# Patient Record
Sex: MALE | Employment: OTHER | URBAN - METROPOLITAN AREA
[De-identification: names, ages, dates, MRNs, and addresses within clinical notes are randomized per-mention and may not be internally consistent; named-entity substitution may affect disease eponyms.]

---

## 2018-08-01 ENCOUNTER — HOSPITAL ENCOUNTER (OUTPATIENT)
Dept: PREADMISSION TESTING | Age: 73
Discharge: HOME | End: 2018-08-01
Payer: MEDICARE

## 2018-08-01 VITALS
SYSTOLIC BLOOD PRESSURE: 131 MMHG | TEMPERATURE: 95.6 F | WEIGHT: 175.38 LBS | BODY MASS INDEX: 26.58 KG/M2 | HEIGHT: 68 IN | DIASTOLIC BLOOD PRESSURE: 76 MMHG

## 2018-08-01 LAB
APPEARANCE UR: CLEAR
ATRIAL RATE: 65 BPM
BACTERIA URNS QL MICRO: NEGATIVE /HPF
BILIRUB UR QL: NEGATIVE
CALCULATED P AXIS, ECG09: 18 DEGREES
CALCULATED R AXIS, ECG10: 11 DEGREES
CALCULATED T AXIS, ECG11: 16 DEGREES
COLOR UR: ABNORMAL
DIAGNOSIS, 93000: NORMAL
EPITH CASTS URNS QL MICRO: ABNORMAL /LPF
GLUCOSE UR STRIP.AUTO-MCNC: NEGATIVE MG/DL
HGB UR QL STRIP: NEGATIVE
HYALINE CASTS URNS QL MICRO: ABNORMAL /LPF (ref 0–5)
KETONES UR QL STRIP.AUTO: NEGATIVE MG/DL
LEUKOCYTE ESTERASE UR QL STRIP.AUTO: ABNORMAL
NITRITE UR QL STRIP.AUTO: NEGATIVE
P-R INTERVAL, ECG05: 174 MS
PH UR STRIP: 6 [PH] (ref 5–8)
PROT UR STRIP-MCNC: NEGATIVE MG/DL
Q-T INTERVAL, ECG07: 398 MS
QRS DURATION, ECG06: 76 MS
QTC CALCULATION (BEZET), ECG08: 413 MS
RBC #/AREA URNS HPF: ABNORMAL /HPF (ref 0–5)
SP GR UR REFRACTOMETRY: 1.01 (ref 1–1.03)
UROBILINOGEN UR QL STRIP.AUTO: 0.2 EU/DL (ref 0.2–1)
VENTRICULAR RATE, ECG03: 65 BPM
WBC URNS QL MICRO: ABNORMAL /HPF (ref 0–4)

## 2018-08-01 PROCEDURE — 87086 URINE CULTURE/COLONY COUNT: CPT | Performed by: UROLOGY

## 2018-08-01 PROCEDURE — 81001 URINALYSIS AUTO W/SCOPE: CPT | Performed by: UROLOGY

## 2018-08-01 PROCEDURE — 93005 ELECTROCARDIOGRAM TRACING: CPT

## 2018-08-01 RX ORDER — CALCIUM CARBONATE 500(1250)
2 TABLET ORAL DAILY
COMMUNITY

## 2018-08-01 RX ORDER — ASCORBIC ACID 500 MG
500 TABLET ORAL DAILY
COMMUNITY

## 2018-08-01 RX ORDER — ASPIRIN 81 MG/1
81 TABLET ORAL DAILY
COMMUNITY

## 2018-08-01 RX ORDER — ATORVASTATIN CALCIUM 20 MG/1
20 TABLET, FILM COATED ORAL
COMMUNITY

## 2018-08-01 RX ORDER — LISINOPRIL 10 MG/1
10 TABLET ORAL
COMMUNITY

## 2018-08-01 RX ORDER — ACETAMINOPHEN 500 MG
4000 TABLET ORAL DAILY
COMMUNITY

## 2018-08-01 RX ORDER — ACETAMINOPHEN 500 MG
1000 TABLET ORAL
COMMUNITY

## 2018-08-01 NOTE — PERIOP NOTES
Pre-Operative Instructions DO NOT EAT OR DRINK ANYTHING AFTER MIDNIGHT THE NIGHT BEFORE SURGERY. Patient given surgical site infection information FAQs handout and hand hygiene tips sheet. Pre-operative instructions reviewed and patient verbalizes understanding of instructions. Patient has been given the opportunity to ask additional questions.

## 2018-08-02 LAB
BACTERIA SPEC CULT: NORMAL
CC UR VC: NORMAL
SERVICE CMNT-IMP: NORMAL

## 2018-08-08 ENCOUNTER — ANESTHESIA (OUTPATIENT)
Dept: SURGERY | Age: 73
End: 2018-08-08
Payer: MEDICARE

## 2018-08-08 ENCOUNTER — HOSPITAL ENCOUNTER (OUTPATIENT)
Age: 73
Setting detail: OUTPATIENT SURGERY
Discharge: HOME | End: 2018-08-08
Attending: UROLOGY | Admitting: UROLOGY
Payer: MEDICARE

## 2018-08-08 ENCOUNTER — ANESTHESIA EVENT (OUTPATIENT)
Dept: SURGERY | Age: 73
End: 2018-08-08
Payer: MEDICARE

## 2018-08-08 VITALS
SYSTOLIC BLOOD PRESSURE: 146 MMHG | RESPIRATION RATE: 17 BRPM | HEIGHT: 68 IN | DIASTOLIC BLOOD PRESSURE: 82 MMHG | BODY MASS INDEX: 26.58 KG/M2 | WEIGHT: 175.38 LBS | HEART RATE: 87 BPM | OXYGEN SATURATION: 98 % | TEMPERATURE: 97.8 F

## 2018-08-08 PROCEDURE — 74011250636 HC RX REV CODE- 250/636

## 2018-08-08 PROCEDURE — 74011250636 HC RX REV CODE- 250/636: Performed by: UROLOGY

## 2018-08-08 PROCEDURE — 77030026438 HC STYL ET INTUB CARD -A: Performed by: ANESTHESIOLOGY

## 2018-08-08 PROCEDURE — 77030010545: Performed by: UROLOGY

## 2018-08-08 PROCEDURE — 76210000021 HC REC RM PH II 0.5 TO 1 HR: Performed by: UROLOGY

## 2018-08-08 PROCEDURE — 77030005546 HC CATH URETH FOL 3W BARD -A: Performed by: UROLOGY

## 2018-08-08 PROCEDURE — 77030011274 HC ELECTRD CUT LP RWOL -F: Performed by: UROLOGY

## 2018-08-08 PROCEDURE — 76210000016 HC OR PH I REC 1 TO 1.5 HR: Performed by: UROLOGY

## 2018-08-08 PROCEDURE — 77030011640 HC PAD GRND REM COVD -A: Performed by: UROLOGY

## 2018-08-08 PROCEDURE — 88305 TISSUE EXAM BY PATHOLOGIST: CPT | Performed by: UROLOGY

## 2018-08-08 PROCEDURE — 74011250636 HC RX REV CODE- 250/636: Performed by: ANESTHESIOLOGY

## 2018-08-08 PROCEDURE — 76010000149 HC OR TIME 1 TO 1.5 HR: Performed by: UROLOGY

## 2018-08-08 PROCEDURE — 77030021707 HC SET IRR FLD WRMR 3M -B: Performed by: UROLOGY

## 2018-08-08 PROCEDURE — 77030008684 HC TU ET CUF COVD -B: Performed by: ANESTHESIOLOGY

## 2018-08-08 PROCEDURE — 77030018846 HC SOL IRR STRL H20 ICUM -A: Performed by: UROLOGY

## 2018-08-08 PROCEDURE — 77030010509 HC AIRWY LMA MSK TELE -A: Performed by: ANESTHESIOLOGY

## 2018-08-08 PROCEDURE — 74011000250 HC RX REV CODE- 250

## 2018-08-08 PROCEDURE — 77030018830 HC SOL IRR GLYC ICUM-A: Performed by: UROLOGY

## 2018-08-08 PROCEDURE — 76060000033 HC ANESTHESIA 1 TO 1.5 HR: Performed by: UROLOGY

## 2018-08-08 PROCEDURE — 77030032490 HC SLV COMPR SCD KNE COVD -B: Performed by: UROLOGY

## 2018-08-08 RX ORDER — MORPHINE SULFATE 10 MG/ML
2 INJECTION, SOLUTION INTRAMUSCULAR; INTRAVENOUS
Status: DISCONTINUED | OUTPATIENT
Start: 2018-08-08 | End: 2018-08-08 | Stop reason: HOSPADM

## 2018-08-08 RX ORDER — SODIUM CHLORIDE, SODIUM LACTATE, POTASSIUM CHLORIDE, CALCIUM CHLORIDE 600; 310; 30; 20 MG/100ML; MG/100ML; MG/100ML; MG/100ML
125 INJECTION, SOLUTION INTRAVENOUS CONTINUOUS
Status: DISCONTINUED | OUTPATIENT
Start: 2018-08-08 | End: 2018-08-08 | Stop reason: HOSPADM

## 2018-08-08 RX ORDER — ONDANSETRON 2 MG/ML
INJECTION INTRAMUSCULAR; INTRAVENOUS AS NEEDED
Status: DISCONTINUED | OUTPATIENT
Start: 2018-08-08 | End: 2018-08-08 | Stop reason: HOSPADM

## 2018-08-08 RX ORDER — SODIUM CHLORIDE 0.9 % (FLUSH) 0.9 %
5-10 SYRINGE (ML) INJECTION EVERY 8 HOURS
Status: DISCONTINUED | OUTPATIENT
Start: 2018-08-08 | End: 2018-08-08 | Stop reason: HOSPADM

## 2018-08-08 RX ORDER — ONDANSETRON 2 MG/ML
4 INJECTION INTRAMUSCULAR; INTRAVENOUS AS NEEDED
Status: DISCONTINUED | OUTPATIENT
Start: 2018-08-08 | End: 2018-08-08 | Stop reason: HOSPADM

## 2018-08-08 RX ORDER — SODIUM CHLORIDE 0.9 % (FLUSH) 0.9 %
5-10 SYRINGE (ML) INJECTION AS NEEDED
Status: DISCONTINUED | OUTPATIENT
Start: 2018-08-08 | End: 2018-08-08 | Stop reason: HOSPADM

## 2018-08-08 RX ORDER — PROPOFOL 10 MG/ML
INJECTION, EMULSION INTRAVENOUS AS NEEDED
Status: DISCONTINUED | OUTPATIENT
Start: 2018-08-08 | End: 2018-08-08 | Stop reason: HOSPADM

## 2018-08-08 RX ORDER — SODIUM CHLORIDE 9 MG/ML
50 INJECTION, SOLUTION INTRAVENOUS CONTINUOUS
Status: DISCONTINUED | OUTPATIENT
Start: 2018-08-08 | End: 2018-08-08 | Stop reason: HOSPADM

## 2018-08-08 RX ORDER — DEXAMETHASONE SODIUM PHOSPHATE 4 MG/ML
INJECTION, SOLUTION INTRA-ARTICULAR; INTRALESIONAL; INTRAMUSCULAR; INTRAVENOUS; SOFT TISSUE AS NEEDED
Status: DISCONTINUED | OUTPATIENT
Start: 2018-08-08 | End: 2018-08-08 | Stop reason: HOSPADM

## 2018-08-08 RX ORDER — LIDOCAINE HYDROCHLORIDE 10 MG/ML
0.1 INJECTION, SOLUTION EPIDURAL; INFILTRATION; INTRACAUDAL; PERINEURAL AS NEEDED
Status: DISCONTINUED | OUTPATIENT
Start: 2018-08-08 | End: 2018-08-08 | Stop reason: HOSPADM

## 2018-08-08 RX ORDER — OXYCODONE AND ACETAMINOPHEN 5; 325 MG/1; MG/1
1 TABLET ORAL AS NEEDED
Status: DISCONTINUED | OUTPATIENT
Start: 2018-08-08 | End: 2018-08-08 | Stop reason: HOSPADM

## 2018-08-08 RX ORDER — MIDAZOLAM HYDROCHLORIDE 1 MG/ML
1 INJECTION, SOLUTION INTRAMUSCULAR; INTRAVENOUS AS NEEDED
Status: DISCONTINUED | OUTPATIENT
Start: 2018-08-08 | End: 2018-08-08 | Stop reason: HOSPADM

## 2018-08-08 RX ORDER — SODIUM CHLORIDE 9 MG/ML
1000 INJECTION, SOLUTION INTRAVENOUS CONTINUOUS
Status: DISCONTINUED | OUTPATIENT
Start: 2018-08-08 | End: 2018-08-08 | Stop reason: HOSPADM

## 2018-08-08 RX ORDER — PHENAZOPYRIDINE HYDROCHLORIDE 100 MG/1
100 TABLET, FILM COATED ORAL
Qty: 21 TAB | Refills: 1 | Status: SHIPPED | OUTPATIENT
Start: 2018-08-08 | End: 2018-08-15

## 2018-08-08 RX ORDER — SUCCINYLCHOLINE CHLORIDE 20 MG/ML
INJECTION INTRAMUSCULAR; INTRAVENOUS AS NEEDED
Status: DISCONTINUED | OUTPATIENT
Start: 2018-08-08 | End: 2018-08-08 | Stop reason: HOSPADM

## 2018-08-08 RX ORDER — FENTANYL CITRATE 50 UG/ML
25 INJECTION, SOLUTION INTRAMUSCULAR; INTRAVENOUS
Status: DISCONTINUED | OUTPATIENT
Start: 2018-08-08 | End: 2018-08-08 | Stop reason: HOSPADM

## 2018-08-08 RX ORDER — DIPHENHYDRAMINE HYDROCHLORIDE 50 MG/ML
12.5 INJECTION, SOLUTION INTRAMUSCULAR; INTRAVENOUS AS NEEDED
Status: DISCONTINUED | OUTPATIENT
Start: 2018-08-08 | End: 2018-08-08 | Stop reason: HOSPADM

## 2018-08-08 RX ORDER — FENTANYL CITRATE 50 UG/ML
50 INJECTION, SOLUTION INTRAMUSCULAR; INTRAVENOUS AS NEEDED
Status: DISCONTINUED | OUTPATIENT
Start: 2018-08-08 | End: 2018-08-08 | Stop reason: HOSPADM

## 2018-08-08 RX ORDER — CEFAZOLIN SODIUM/WATER 2 G/20 ML
2 SYRINGE (ML) INTRAVENOUS
Status: COMPLETED | OUTPATIENT
Start: 2018-08-08 | End: 2018-08-08

## 2018-08-08 RX ORDER — FENTANYL CITRATE 50 UG/ML
INJECTION, SOLUTION INTRAMUSCULAR; INTRAVENOUS AS NEEDED
Status: DISCONTINUED | OUTPATIENT
Start: 2018-08-08 | End: 2018-08-08 | Stop reason: HOSPADM

## 2018-08-08 RX ORDER — MIDAZOLAM HYDROCHLORIDE 1 MG/ML
0.5 INJECTION, SOLUTION INTRAMUSCULAR; INTRAVENOUS
Status: DISCONTINUED | OUTPATIENT
Start: 2018-08-08 | End: 2018-08-08 | Stop reason: HOSPADM

## 2018-08-08 RX ORDER — LIDOCAINE HYDROCHLORIDE 20 MG/ML
INJECTION, SOLUTION EPIDURAL; INFILTRATION; INTRACAUDAL; PERINEURAL AS NEEDED
Status: DISCONTINUED | OUTPATIENT
Start: 2018-08-08 | End: 2018-08-08 | Stop reason: HOSPADM

## 2018-08-08 RX ORDER — DEXMEDETOMIDINE HYDROCHLORIDE 4 UG/ML
INJECTION, SOLUTION INTRAVENOUS AS NEEDED
Status: DISCONTINUED | OUTPATIENT
Start: 2018-08-08 | End: 2018-08-08 | Stop reason: HOSPADM

## 2018-08-08 RX ADMIN — FENTANYL CITRATE 50 MCG: 50 INJECTION, SOLUTION INTRAMUSCULAR; INTRAVENOUS at 07:35

## 2018-08-08 RX ADMIN — ONDANSETRON 4 MG: 2 INJECTION INTRAMUSCULAR; INTRAVENOUS at 07:47

## 2018-08-08 RX ADMIN — DEXMEDETOMIDINE HYDROCHLORIDE 20 MCG: 4 INJECTION, SOLUTION INTRAVENOUS at 08:08

## 2018-08-08 RX ADMIN — PROPOFOL 50 MG: 10 INJECTION, EMULSION INTRAVENOUS at 08:08

## 2018-08-08 RX ADMIN — Medication 2 G: at 07:40

## 2018-08-08 RX ADMIN — LIDOCAINE HYDROCHLORIDE 100 MG: 20 INJECTION, SOLUTION EPIDURAL; INFILTRATION; INTRACAUDAL; PERINEURAL at 07:35

## 2018-08-08 RX ADMIN — PROPOFOL 150 MG: 10 INJECTION, EMULSION INTRAVENOUS at 07:35

## 2018-08-08 RX ADMIN — DEXAMETHASONE SODIUM PHOSPHATE 4 MG: 4 INJECTION, SOLUTION INTRA-ARTICULAR; INTRALESIONAL; INTRAMUSCULAR; INTRAVENOUS; SOFT TISSUE at 07:47

## 2018-08-08 RX ADMIN — SUCCINYLCHOLINE CHLORIDE 160 MG: 20 INJECTION INTRAMUSCULAR; INTRAVENOUS at 07:38

## 2018-08-08 RX ADMIN — FENTANYL CITRATE 50 MCG: 50 INJECTION, SOLUTION INTRAMUSCULAR; INTRAVENOUS at 07:55

## 2018-08-08 RX ADMIN — SODIUM CHLORIDE, POTASSIUM CHLORIDE, SODIUM LACTATE AND CALCIUM CHLORIDE: 600; 310; 30; 20 INJECTION, SOLUTION INTRAVENOUS at 06:24

## 2018-08-08 NOTE — BRIEF OP NOTE
BRIEF OPERATIVE NOTE    Date of Procedure: 8/8/2018   Preoperative Diagnosis: BLADDER CANCER   Postoperative Diagnosis: BLADDER CANCER     Procedure(s):  Transurethral Resection of Bladder Tumor and Prostatic Urethral Biospy  Surgeon(s) and Role:     * Esequiel Webber MD - Primary         Surgical Assistant: 0    Surgical Staff:  Circ-1: Elsy Vargas RN  Scrub Tech-1: Jessica Darling  Float Staff: Beka Roland RN  Event Time In   Incision Start 7583   Incision Close 0818     Anesthesia: General   Estimated Blood Loss: 50  Specimens:   ID Type Source Tests Collected by Time Destination   1 : [facility] Lesion Preservative Bladder  Esequiel Webber MD 8/8/2018 7162 Pathology   2 : Right Posterior Wall of [place] Preservative Bladder  Esequiel Webber MD 8/8/2018 0809 Pathology   3 : Right Lateral Wall of Bladder Preservative Bladder  Esequiel Webber MD 8/8/2018 3352 Pathology   4 : Left Lateral Wall of Bladder Preservative Bladder  Esequiel Webber MD 8/8/2018 0813 Pathology   5 : Prostatic Urethra Biopsy Preservative Loc Webber MD 8/8/2018 7677 Pathology      Findings: red patches. Bleed easily.  bx done   Complications: 0  Implants: * No implants in log *

## 2018-08-08 NOTE — PERIOP NOTES
Called to the Surgical Waiting and spoke with the patient's wife to let them know that we had started the procedure at 748 am.  Also that we would update them as needed.

## 2018-08-08 NOTE — DISCHARGE INSTRUCTIONS
Patient Discharge Instructions    Guerrero Perdomo / [id number] : 1945    Admitted 2018 Discharged: 2018     Take Home Medications       · It is important that you take the medication exactly as they are prescribed. · Keep your medication in the bottles provided by the pharmacist and keep a list of the medication names, dosages, and times to be taken in your wallet. · Do not take other medications without consulting your doctor. What to do at Home      Recommended activity: No Lifting for 6 weeks. Follow-up with [address] [place] [address]  Urology. Call for an appointment (if not already scheduled)            [phone]. Information obtained by :  I understand that if any problems occur once I am at home I am to contact my physician. I understand and acknowledge receipt of the instructions indicated above. Physician's or R.N.'s Signature                                                                  Date/Time                                                                                                                                              Patient or Representative Signature                                                          Date/Time        DISCHARGE SUMMARY from Nurse    PATIENT INSTRUCTIONS:    After general anesthesia or intravenous sedation, for 24 hours or while taking prescription Narcotics:  · Limit your activities  · Do not drive and operate hazardous machinery  · Do not make important personal or business decisions  · Do  not drink alcoholic beverages  · If you have not urinated within 8 hours after discharge, please contact your surgeon on call.     Report the following to your surgeon:  · Excessive pain, swelling, redness or odor of or around the surgical area  · Temperature over 100.5  · Nausea and vomiting lasting longer than 4 hours or if unable to take medications  · Any signs of decreased circulation or nerve impairment to extremity: change in color, persistent  numbness, tingling, coldness or increase pain  · Any questions    The discharge information has been reviewed with the patient and spouse. The patient and spouse verbalized understanding. Discharge medications reviewed with the patient and spouse and appropriate educational materials and side effects teaching were provided.   ___________________________________________________________________________________________________________________________________

## 2018-08-08 NOTE — OP NOTES
[address] MEGAN Mcmillan  MR#: [id number]  : 1945  ACCOUNT #: [de-identified]   DATE OF SERVICE: 2018    PREOPERATIVE DIAGNOSIS:  Recurrent carcinoma in situ. POSTOPERATIVE DIAGNOSIS:  Recurrent carcinoma in situ. PROCEDURE PERFORMED:  Transurethral resection, medium bladder lesion. COMPLICATIONS:  None. ESTIMATED BLOOD LOSS:  50 mL. SPECIMENS REMOVED:  Right posterior wall, dome, left lateral wall, right lateral wall, prostatic urethra. ANESTHESIA:  General.    IMPLANTS:  None. ASSISTANT:  None. SURGEON:  Sonny Marino MD    PROCEDURE:  After anesthesia, the patient was prepped and draped in the lithotomy position. The 21 cystoscope was passed down the urethra. There was some erythema within the prostatic urethra, centered more on the left side. Upon entering the bladder and distending the bladder with irrigation, spontaneous bleeding began to occur from a velvet reddened patch involving the right posterior wall and another smaller velvety reddened area in the dome to the left of the midline. The area involving the right posterior wall was 4 cm and the one in the dome was 2 cm. Using the transurethral resection forceps, multiple pieces of tissue were taken from both of these areas and then the lesion was aggressively fulgurated for hemostasis and treatment effect. Samples were taken from the right lateral wall, left lateral wall, and the erythematous area within the prostatic urethra was also sampled and fulgurated. Hemostasis was obtained with the Bugbee electrode and bleeding seemed to be well controlled. A 24-Wolof 3-way Reyes was inserted and put to straight drain and the return was clear. The patient was reacted from anesthesia and transferred to the recovery in stable condition.       MD LASHELL Stephens / ONEYDA  D: 2018 08:19     T: 2018 14:44  JOB #: [id number]  CC: Bertin Gross MD

## 2018-08-08 NOTE — ANESTHESIA PREPROCEDURE EVALUATION
Anesthetic History   No history of anesthetic complications            Review of Systems / Medical History  Patient summary reviewed, nursing notes reviewed and pertinent labs reviewed    Pulmonary  Within defined limits                 Neuro/Psych   Within defined limits           Cardiovascular  Within defined limits  Hypertension                   GI/Hepatic/Renal  Within defined limits   GERD           Endo/Other  Within defined limits           Other Findings              Physical Exam    Airway  Mallampati: I  TM Distance: > 6 cm  Neck ROM: normal range of motion   Mouth opening: Normal     Cardiovascular  Regular rate and rhythm,  S1 and S2 normal,  no murmur, click, rub, or gallop             Dental  No notable dental hx       Pulmonary  Breath sounds clear to auscultation               Abdominal  GI exam deferred       Other Findings            Anesthetic Plan    ASA: 2  Anesthesia type: general          Induction: Intravenous  Anesthetic plan and risks discussed with: Patient

## 2018-08-08 NOTE — PERIOP NOTES
Patient: Jaxon Bruce MRN: [id number]  SSN: [id number]   YOB: 1945  Age: 74 y.o. Sex: male     Patient is status post Procedure(s):  Transurethral Resection of Bladder Tumor and Prostatic Urethral Biospy.     Surgeon(s) and Role:     * Flora Aldana MD - Primary    Local/Dose/Irrigation:  See [facility]                  Peripheral IV 08/08/18 Right Antecubital (Active)   Dressing Status New 8/8/2018  7:16 AM   Dressing Type Transparent 8/8/2018  7:16 AM   Hub Color/Line Status Infusing 8/8/2018  7:16 AM                           Dressing/Packing:     Splint/Cast:  ]    Other:

## 2018-08-08 NOTE — ANESTHESIA POSTPROCEDURE EVALUATION
Post-Anesthesia Evaluation and Assessment    Patient: Kaleigh Melendrez MRN: [id number]  SSN: [id number]    YOB: 1945  Age: 74 y.o. Sex: male       Cardiovascular Function/Vital Signs  Visit Vitals    /82    Pulse 87    Temp 36.6 °C (97.8 °F)    Resp 17    Ht 5' 8\" (1.727 m)    Wt 79.5 kg (175 lb 6 oz)    SpO2 98%    BMI 26.67 kg/m2       Patient is status post general anesthesia for Procedure(s):  Transurethral Resection of Bladder Tumor and Prostatic Urethral Biospy. Nausea/Vomiting: None    Postoperative hydration reviewed and adequate. Pain:  Pain Scale 1: Numeric (0 - 10) (08/08/18 0915)  Pain Intensity 1: 0 (feels urge to urinate) (08/08/18 0915)   Managed    Neurological Status:   Neuro (WDL): Within Defined Limits (08/08/18 0845)   At baseline    Mental Status and Level of Consciousness: Arousable    Pulmonary Status:   O2 Device: Room air (08/08/18 0915)   Adequate oxygenation and airway patent    Complications related to anesthesia: None    Post-anesthesia assessment completed.  No concerns    Signed By: Susanne Cervantes MD     August 8, 2018

## 2018-08-08 NOTE — PERIOP NOTES
0915 voiding trial done, pt tp;erated 180cc sterile wATER INSERTION. Able to void 180cc into urinal.

## 2018-08-08 NOTE — IP AVS SNAPSHOT
[facility] Tamara [address] 13 
[phone] Patient: Janine Kay MRN: [id number] PEE:0/60/2411 About your hospitalization You were admitted on:  August 8, 2018 You last received care in the:  [facility] PACU You were discharged on:  August 8, 2018 Why you were hospitalized Your primary diagnosis was:  Not on File Follow-up Information Follow up With Details Comments Contact Info Jeanine Looney MD   [facility] [address] 
[phone] Discharge Orders None A check nel indicates which time of day the medication should be taken. My Medications START taking these medications Instructions Each Dose to Equal  
 Morning Noon Evening Bedtime  
 phenazopyridine 100 mg tablet Commonly known as:  PYRIDIUM Your last dose was: Your next dose is: Take 1 Tab by mouth three (3) times daily as needed for Pain for up to 7 days. 100 mg CONTINUE taking these medications Instructions Each Dose to Equal  
 Morning Noon Evening Bedtime  
 aspirin delayed-release 81 mg tablet Your last dose was: Your next dose is: Take 81 mg by mouth daily. 81 mg  
    
   
   
   
  
 calcium carbonate 500 mg calcium (1,250 mg) tablet Commonly known as:  OS-BLANE Your last dose was: Your next dose is: Take 2 Tabs by mouth daily. 2 Tab Cholecalciferol (Vitamin D3) 2,000 unit Cap capsule Commonly known as:  VITAMIN D3 Your last dose was: Your next dose is: Take 2,000 Units by mouth daily. 2000 Units LIPITOR 20 mg tablet Generic drug:  atorvastatin Your last dose was: Your next dose is: Take 20 mg by mouth daily. 20 mg  
    
   
   
   
  
 lisinopril 10 mg tablet Commonly known as:  Carmelina Andrews Your last dose was: Your next dose is: Take 10 mg by mouth nightly. 10 mg  
    
   
   
   
  
 SUPER B COMPLEX PO Your last dose was: Your next dose is: Take 1 Tab by mouth daily. 1 Tab TYLENOL EXTRA STRENGTH 500 mg tablet Generic drug:  acetaminophen Your last dose was: Your next dose is: Take 1,000 mg by mouth every six (6) hours as needed for Pain. 1000 mg VITALINE COQ10 PO Your last dose was: Your next dose is: Take 200 mg by mouth daily. 200 mg  
    
   
   
   
  
 VITAMIN C 500 mg tablet Generic drug:  ascorbic acid (vitamin C) Your last dose was: Your next dose is: Take 500 mg by mouth daily. 500 mg Where to Get Your Medications Information on where to get these meds will be given to you by the nurse or doctor. ! Ask your nurse or doctor about these medications  
  phenazopyridine 100 mg tablet Discharge Instructions Patient Discharge Instructions Samantha Joseph / [id number] : 1945 Admitted 2018 Discharged: 2018 Take Home Medications · It is important that you take the medication exactly as they are prescribed. · Keep your medication in the bottles provided by the pharmacist and keep a list of the medication names, dosages, and times to be taken in your wallet. · Do not take other medications without consulting your doctor. What to do at [facility] Recommended activity: No Lifting for 6 weeks. Follow-up with [place]  [facility]. Call for an appointment (if not already scheduled)            [phone]. Information obtained by : 
I understand that if any problems occur once I am at home I am to contact my physician. I understand and acknowledge receipt of the instructions indicated above. Physician's or R.N.'s Signature                                                                  Date/Time Patient or Representative Signature                                                          Date/Time DISCHARGE SUMMARY from Nurse PATIENT INSTRUCTIONS: 
 
After general anesthesia or intravenous sedation, for 24 hours or while taking prescription Narcotics: · Limit your activities · Do not drive and operate hazardous machinery · Do not make important personal or business decisions · Do  not drink alcoholic beverages · If you have not urinated within 8 hours after discharge, please contact your surgeon on call. Report the following to your surgeon: 
· Excessive pain, swelling, redness or odor of or around the surgical area · Temperature over 100.5 · Nausea and vomiting lasting longer than 4 hours or if unable to take medications · Any signs of decreased circulation or nerve impairment to extremity: change in color, persistent  numbness, tingling, coldness or increase pain · Any questions The discharge information has been reviewed with the patient and spouse. The patient and spouse verbalized understanding. Discharge medications reviewed with the patient and spouse and appropriate educational materials and side effects teaching were provided. ___________________________________________________________________________________________________________________________________ Introducing [facility]!    
 [facility] introduces [employer] patient portal. Now you can access parts of your medical record, email your doctor's office, and request medication refills online. 1. In your internet browser, go to [email or web address] 2. Click on the First Time User? Click Here link in the Sign In box. You will see the New Member Sign Up page. 3. Enter your [employer] Access Code exactly as it appears below. You will not need to use this code after youve completed the sign-up process. If you do not sign up before the expiration date, you must request a new code. · [employer] Access Code: [email or web address] Expires: 10/30/2018 10:15 AM 
 
4. Enter the last four digits of your Social Security Number (xxxx) and Date of Birth (mm/dd/yyyy) as indicated and click Submit. You will be taken to the next sign-up page. 5. Create a GrupHediye ID. This will be your Gr[employer] login ID and cannot be changed, so think of one that is secure and easy to remember. 6. Create a [employer] password. You can change your password at any time. 7. Enter your Password Reset Question and Answer. This can be used at a later time if you forget your password. 8. Enter your e-mail address. You will receive e-mail notification when new information is available in [address] 9. Click Sign Up. You can now view and download portions of your medical record. 10. Click the Download Summary menu link to download a portable copy of your medical information. If you have questions, please visit the Frequently Asked Questions section of the [employer] website. Remember, [employer]ye is NOT to be used for urgent needs. For medical emergencies, dial 911. Now available from your iPhone and Android! Introducing Zan Phillips As a [facility] patient, I wanted to make you aware of our electronic visit tool called Zan Phillips. [facility] [address] 24/7 allows you to connect within minutes with a medical provider 24 hours a day, seven days a week via a mobile device or tablet or logging into a secure website from your computer. You can access [employer] from anywhere in the [place]. A virtual visit might be right for you when you have a simple condition and feel like you just dont want to get out of bed, or cant get away from work for an appointment, when your regular [facility] provider is not available (evenings, weekends or holidays), or when youre out of town and need minor care. Electronic visits cost only $49 and if the [employer] 24/Canary Calendar provider determines a prescription is needed to treat your condition, one can be electronically transmitted to a nearby pharmacy*. Please take a moment to enroll today if you have not already done so. The enrollment process is free and takes just a few minutes. To enroll, please download the [employer] keyla to your tablet or phone, or visit [email or web address] to enroll on your computer. And, as an [address] [place] [address] patient with a Aqua-tools account, the results of your visits will be scanned into your electronic medical record and your primary care provider will be able to view the scanned results. We urge you to continue to see your regular [facility] provider for your ongoing medical care. And while your primary care provider may not be the one available when you seek a Zan Formanfin virtual visit, the peace of mind you get from getting a real diagnosis real time can be priceless. For more information on [employer], view our Frequently Asked Questions (FAQs) at [email or web address] [email or web address]. Sincerely, 
 
Amy Benjamin MD 
Chief Medical Officer [employer] *:  certain medications cannot be prescribed via [employer] Providers Seen During Your Hospitalization Provider Specialty Primary office phone Julienne Reynolds MD Urology [phone] Your Primary Care Physician (PCP) Primary Care Physician Office Phone Office Fax Davi Alonzo [phone] You are allergic to the following No active allergies Recent Documentation Height Weight BMI Smoking Status 1.727 m 79.5 kg 26.67 kg/m2 Former Smoker Emergency Contacts Name Discharge Info [facility]ation [facility] DISCHARGE CAREGIVER [3] Spouse [3] [phone] Patient Belongings The following personal items are in your possession at time of discharge: 
  Dental Appliances: Partials, Uppers  Visual Aid: Glasses, With patient      Home Medications: None   Jewelry: None  Clothing: Other (comment) (clothes bag and glasses to pacu)    Other Valuables: Eyeglasses, With patient, Brace (leg brace) Discharge Instructions Attachments/References MEFS - PHENAZOPYRIDINE (PYRIDIUM, PYRIDIATE, AZO STANDARD) - (BY MOUTH) (ENGLISH) Patient Handouts Phenazopyridine (Pyridium, Pyridiate, Azo Standard) - (By mouth) Why this medicine is used:  
Relieves symptoms caused by urinary tract infections and other urinary problems. Contact a nurse or doctor right away if you have: 
· Skin rash or hives · Trouble breathing · Yellowing of the skin or eyes Common side effects: 
· Indigestion, stomach upset · Dizziness · Headache © 2017 [address] Information is for End User's use only and may not be sold, redistributed or otherwise used for commercial purposes. Please provide this summary of care documentation to your next provider. Signatures-by signing, you are acknowledging that this After Visit Summary has been reviewed with you and you have received a copy. Patient Signature:  ____________________________________________________________ Date:  ____________________________________________________________  
  
Sarah Little Provider Signature:  ____________________________________________________________ Date:  ____________________________________________________________

## 2018-08-21 ENCOUNTER — HOSPITAL ENCOUNTER (OUTPATIENT)
Dept: PREADMISSION TESTING | Age: 73
Discharge: HOME | End: 2018-08-21
Payer: MEDICARE

## 2018-08-21 VITALS
WEIGHT: 178.38 LBS | BODY MASS INDEX: 27.03 KG/M2 | OXYGEN SATURATION: 94 % | SYSTOLIC BLOOD PRESSURE: 134 MMHG | TEMPERATURE: 98 F | HEART RATE: 74 BPM | HEIGHT: 68 IN | DIASTOLIC BLOOD PRESSURE: 76 MMHG

## 2018-08-21 LAB
ABO + RH BLD: NORMAL
ALBUMIN SERPL-MCNC: 3.8 G/DL (ref 3.5–5)
ALBUMIN/GLOB SERPL: 1.2 {RATIO} (ref 1.1–2.2)
ALP SERPL-CCNC: 74 U/L (ref 45–117)
ALT SERPL-CCNC: 31 U/L (ref 12–78)
ANION GAP SERPL CALC-SCNC: 8 MMOL/L (ref 5–15)
APPEARANCE UR: CLEAR
APTT PPP: 27.6 SEC (ref 22.1–32)
AST SERPL-CCNC: 23 U/L (ref 15–37)
BACTERIA URNS QL MICRO: NEGATIVE /HPF
BILIRUB SERPL-MCNC: 0.4 MG/DL (ref 0.2–1)
BILIRUB UR QL: NEGATIVE
BLOOD GROUP ANTIBODIES SERPL: NORMAL
BUN SERPL-MCNC: 13 MG/DL (ref 6–20)
BUN/CREAT SERPL: 13 (ref 12–20)
CALCIUM SERPL-MCNC: 8.7 MG/DL (ref 8.5–10.1)
CEA SERPL-MCNC: 6.1 NG/ML
CHLORIDE SERPL-SCNC: 98 MMOL/L (ref 97–108)
CO2 SERPL-SCNC: 29 MMOL/L (ref 21–32)
COLOR UR: ABNORMAL
CREAT SERPL-MCNC: 1.03 MG/DL (ref 0.7–1.3)
EPITH CASTS URNS QL MICRO: ABNORMAL /LPF
ERYTHROCYTE [DISTWIDTH] IN BLOOD BY AUTOMATED COUNT: 13.4 % (ref 11.5–14.5)
GLOBULIN SER CALC-MCNC: 3.2 G/DL (ref 2–4)
GLUCOSE SERPL-MCNC: 107 MG/DL (ref 65–100)
GLUCOSE UR STRIP.AUTO-MCNC: NEGATIVE MG/DL
HCT VFR BLD AUTO: 42.5 % (ref 36.6–50.3)
HGB BLD-MCNC: 14 G/DL (ref 12.1–17)
HGB UR QL STRIP: NEGATIVE
INR PPP: 1.1 (ref 0.9–1.1)
KETONES UR QL STRIP.AUTO: NEGATIVE MG/DL
LEUKOCYTE ESTERASE UR QL STRIP.AUTO: ABNORMAL
MAGNESIUM SERPL-MCNC: 2.2 MG/DL (ref 1.6–2.4)
MCH RBC QN AUTO: 30.2 PG (ref 26–34)
MCHC RBC AUTO-ENTMCNC: 32.9 G/DL (ref 30–36.5)
MCV RBC AUTO: 91.8 FL (ref 80–99)
NITRITE UR QL STRIP.AUTO: NEGATIVE
NRBC # BLD: 0 K/UL (ref 0–0.01)
NRBC BLD-RTO: 0 PER 100 WBC
PH UR STRIP: 6 [PH] (ref 5–8)
PHOSPHATE SERPL-MCNC: 3.6 MG/DL (ref 2.6–4.7)
PLATELET # BLD AUTO: 296 K/UL (ref 150–400)
PMV BLD AUTO: 10.4 FL (ref 8.9–12.9)
POTASSIUM SERPL-SCNC: 4.6 MMOL/L (ref 3.5–5.1)
PROT SERPL-MCNC: 7 G/DL (ref 6.4–8.2)
PROT UR STRIP-MCNC: NEGATIVE MG/DL
PROTHROMBIN TIME: 10.7 SEC (ref 9–11.1)
RBC # BLD AUTO: 4.63 M/UL (ref 4.1–5.7)
RBC #/AREA URNS HPF: ABNORMAL /HPF (ref 0–5)
SODIUM SERPL-SCNC: 135 MMOL/L (ref 136–145)
SP GR UR REFRACTOMETRY: 1.01 (ref 1–1.03)
SPECIMEN EXP DATE BLD: NORMAL
THERAPEUTIC RANGE,PTTT: NORMAL SECS (ref 58–77)
UROBILINOGEN UR QL STRIP.AUTO: 0.2 EU/DL (ref 0.2–1)
WBC # BLD AUTO: 8.2 K/UL (ref 4.1–11.1)
WBC URNS QL MICRO: ABNORMAL /HPF (ref 0–4)

## 2018-08-21 PROCEDURE — 81001 URINALYSIS AUTO W/SCOPE: CPT | Performed by: UROLOGY

## 2018-08-21 PROCEDURE — 83735 ASSAY OF MAGNESIUM: CPT | Performed by: UROLOGY

## 2018-08-21 PROCEDURE — 80053 COMPREHEN METABOLIC PANEL: CPT | Performed by: UROLOGY

## 2018-08-21 PROCEDURE — 87086 URINE CULTURE/COLONY COUNT: CPT | Performed by: UROLOGY

## 2018-08-21 PROCEDURE — 85027 COMPLETE CBC AUTOMATED: CPT | Performed by: UROLOGY

## 2018-08-21 PROCEDURE — 82378 CARCINOEMBRYONIC ANTIGEN: CPT | Performed by: UROLOGY

## 2018-08-21 PROCEDURE — 36415 COLL VENOUS BLD VENIPUNCTURE: CPT | Performed by: UROLOGY

## 2018-08-21 PROCEDURE — 85730 THROMBOPLASTIN TIME PARTIAL: CPT | Performed by: UROLOGY

## 2018-08-21 PROCEDURE — 85610 PROTHROMBIN TIME: CPT | Performed by: UROLOGY

## 2018-08-21 PROCEDURE — 84100 ASSAY OF PHOSPHORUS: CPT | Performed by: UROLOGY

## 2018-08-21 PROCEDURE — 86900 BLOOD TYPING SEROLOGIC ABO: CPT | Performed by: UROLOGY

## 2018-08-21 RX ORDER — SODIUM CHLORIDE 9 MG/ML
250 INJECTION, SOLUTION INTRAVENOUS AS NEEDED
Status: DISCONTINUED | OUTPATIENT
Start: 2018-08-21 | End: 2018-08-25 | Stop reason: HOSPADM

## 2018-08-21 NOTE — PERIOP NOTES
ERAS andPAT instructions reviewed with patient and family; and given the opportunity to ask questions. Patient given surgical site information FAQs handout and reviewed. Patient given CHG wipes and instruction sheet, instructions for use reviewed with patient. Incentive spirometer and instructions for use given to patient and reviewed, return demonstration performed. Notified [facility] stoma markings needed DOS.

## 2018-08-23 LAB
BACTERIA SPEC CULT: NORMAL
CC UR VC: NORMAL
SERVICE CMNT-IMP: NORMAL

## 2018-08-24 ENCOUNTER — ANESTHESIA EVENT (OUTPATIENT)
Dept: SURGERY | Age: 73
DRG: 655 | End: 2018-08-24
Payer: MEDICARE

## 2018-08-27 ENCOUNTER — HOSPITAL ENCOUNTER (INPATIENT)
Age: 73
LOS: 4 days | Discharge: HOME HEALTH | DRG: 655 | End: 2018-08-31
Attending: UROLOGY | Admitting: UROLOGY
Payer: MEDICARE

## 2018-08-27 ENCOUNTER — ANESTHESIA (OUTPATIENT)
Dept: SURGERY | Age: 73
DRG: 655 | End: 2018-08-27
Payer: MEDICARE

## 2018-08-27 ENCOUNTER — APPOINTMENT (OUTPATIENT)
Dept: GENERAL RADIOLOGY | Age: 73
DRG: 655 | End: 2018-08-27
Attending: ANESTHESIOLOGY
Payer: MEDICARE

## 2018-08-27 DIAGNOSIS — L76.82 PAIN AT SURGICAL INCISION: Primary | ICD-10-CM

## 2018-08-27 PROBLEM — C67.9 BLADDER CANCER (HCC): Status: ACTIVE | Noted: 2018-08-27

## 2018-08-27 LAB
ANION GAP SERPL CALC-SCNC: 7 MMOL/L (ref 5–15)
BUN SERPL-MCNC: 15 MG/DL (ref 6–20)
BUN/CREAT SERPL: 11 (ref 12–20)
CALCIUM SERPL-MCNC: 8.1 MG/DL (ref 8.5–10.1)
CHLORIDE SERPL-SCNC: 102 MMOL/L (ref 97–108)
CO2 SERPL-SCNC: 25 MMOL/L (ref 21–32)
CREAT SERPL-MCNC: 1.42 MG/DL (ref 0.7–1.3)
GLUCOSE SERPL-MCNC: 162 MG/DL (ref 65–100)
HCT VFR BLD AUTO: 35.1 % (ref 36.6–50.3)
HGB BLD-MCNC: 11.4 G/DL (ref 12.1–17)
HGB BLD-MCNC: 13.3 G/DL (ref 12.1–17)
HGB BLD-MCNC: 14 G/DL (ref 12.1–17)
POTASSIUM SERPL-SCNC: 4.7 MMOL/L (ref 3.5–5.1)
SODIUM SERPL-SCNC: 134 MMOL/L (ref 136–145)

## 2018-08-27 PROCEDURE — 74011000258 HC RX REV CODE- 258

## 2018-08-27 PROCEDURE — 0TTB0ZZ RESECTION OF BLADDER, OPEN APPROACH: ICD-10-PCS | Performed by: UROLOGY

## 2018-08-27 PROCEDURE — 77030002888 HC SUT CHRMC J&J -A: Performed by: UROLOGY

## 2018-08-27 PROCEDURE — 74011250636 HC RX REV CODE- 250/636

## 2018-08-27 PROCEDURE — 0T180ZC BYPASS BILATERAL URETERS TO ILEOCUTANEOUS, OPEN APPROACH: ICD-10-PCS | Performed by: UROLOGY

## 2018-08-27 PROCEDURE — 88305 TISSUE EXAM BY PATHOLOGIST: CPT | Performed by: UROLOGY

## 2018-08-27 PROCEDURE — 85014 HEMATOCRIT: CPT | Performed by: UROLOGY

## 2018-08-27 PROCEDURE — 77030013259 HC BG UROSTMY HOLL -A: Performed by: UROLOGY

## 2018-08-27 PROCEDURE — 0VT00ZZ RESECTION OF PROSTATE, OPEN APPROACH: ICD-10-PCS | Performed by: UROLOGY

## 2018-08-27 PROCEDURE — C1751 CATH, INF, PER/CENT/MIDLINE: HCPCS

## 2018-08-27 PROCEDURE — 77030025240 HC RELD STPL GIA 2 COVD -C: Performed by: UROLOGY

## 2018-08-27 PROCEDURE — 88307 TISSUE EXAM BY PATHOLOGIST: CPT | Performed by: UROLOGY

## 2018-08-27 PROCEDURE — 77030013079 HC BLNKT BAIR HGGR 3M -A: Performed by: ANESTHESIOLOGY

## 2018-08-27 PROCEDURE — 77030010516 HC APPL HEMA CLP TELE -B: Performed by: UROLOGY

## 2018-08-27 PROCEDURE — 77030002996 HC SUT SLK J&J -A: Performed by: UROLOGY

## 2018-08-27 PROCEDURE — 77030002916 HC SUT ETHLN J&J -A: Performed by: UROLOGY

## 2018-08-27 PROCEDURE — 77030032490 HC SLV COMPR SCD KNE COVD -B: Performed by: UROLOGY

## 2018-08-27 PROCEDURE — 74011000272 HC RX REV CODE- 272: Performed by: UROLOGY

## 2018-08-27 PROCEDURE — 76060000041 HC ANESTHESIA 5 TO 5.5 HR: Performed by: UROLOGY

## 2018-08-27 PROCEDURE — 85018 HEMOGLOBIN: CPT

## 2018-08-27 PROCEDURE — 77030013567 HC DRN WND RESERV BARD -A: Performed by: UROLOGY

## 2018-08-27 PROCEDURE — 36415 COLL VENOUS BLD VENIPUNCTURE: CPT | Performed by: UROLOGY

## 2018-08-27 PROCEDURE — 65270000029 HC RM PRIVATE

## 2018-08-27 PROCEDURE — 77030018836 HC SOL IRR NACL ICUM -A: Performed by: UROLOGY

## 2018-08-27 PROCEDURE — 77030010545: Performed by: UROLOGY

## 2018-08-27 PROCEDURE — 77030002966 HC SUT PDS J&J -A: Performed by: UROLOGY

## 2018-08-27 PROCEDURE — 74011250636 HC RX REV CODE- 250/636: Performed by: UROLOGY

## 2018-08-27 PROCEDURE — 74011250637 HC RX REV CODE- 250/637: Performed by: UROLOGY

## 2018-08-27 PROCEDURE — P9045 ALBUMIN (HUMAN), 5%, 250 ML: HCPCS

## 2018-08-27 PROCEDURE — 80048 BASIC METABOLIC PNL TOTAL CA: CPT | Performed by: UROLOGY

## 2018-08-27 PROCEDURE — 77030019702 HC WRP THER MENM -C: Performed by: UROLOGY

## 2018-08-27 PROCEDURE — 77030012893

## 2018-08-27 PROCEDURE — 76010000177 HC OR TIME 5 TO 5.5 HR INTENSV-TIER 1: Performed by: UROLOGY

## 2018-08-27 PROCEDURE — 74011250636 HC RX REV CODE- 250/636: Performed by: ANESTHESIOLOGY

## 2018-08-27 PROCEDURE — 77030018846 HC SOL IRR STRL H20 ICUM -A: Performed by: UROLOGY

## 2018-08-27 PROCEDURE — 77030031139 HC SUT VCRL2 J&J -A: Performed by: UROLOGY

## 2018-08-27 PROCEDURE — 77030014366 HC DRN WND BNTM -A: Performed by: UROLOGY

## 2018-08-27 PROCEDURE — 76210000063 HC OR PH I REC FIRST 0.5 HR: Performed by: UROLOGY

## 2018-08-27 PROCEDURE — 71045 X-RAY EXAM CHEST 1 VIEW: CPT

## 2018-08-27 PROCEDURE — 07TC0ZZ RESECTION OF PELVIS LYMPHATIC, OPEN APPROACH: ICD-10-PCS | Performed by: UROLOGY

## 2018-08-27 PROCEDURE — 88331 PATH CONSLTJ SURG 1 BLK 1SPC: CPT | Performed by: UROLOGY

## 2018-08-27 PROCEDURE — 0TTD0ZZ RESECTION OF URETHRA, OPEN APPROACH: ICD-10-PCS | Performed by: UROLOGY

## 2018-08-27 PROCEDURE — 77030011244 HC DRN WND HUBLS J&J -B: Performed by: UROLOGY

## 2018-08-27 PROCEDURE — 77030011640 HC PAD GRND REM COVD -A: Performed by: UROLOGY

## 2018-08-27 PROCEDURE — 77030034818 HC STPLR INT GIA COVD -C: Performed by: UROLOGY

## 2018-08-27 PROCEDURE — 88309 TISSUE EXAM BY PATHOLOGIST: CPT | Performed by: UROLOGY

## 2018-08-27 PROCEDURE — 77030019908 HC STETH ESOPH SIMS -A: Performed by: ANESTHESIOLOGY

## 2018-08-27 PROCEDURE — 77030031128 HC SUT MCRYL J&J -D: Performed by: UROLOGY

## 2018-08-27 PROCEDURE — 77030010939 HC CLP LIG TELE -B: Performed by: UROLOGY

## 2018-08-27 PROCEDURE — 77030018673: Performed by: UROLOGY

## 2018-08-27 PROCEDURE — 77030020782 HC GWN BAIR PAWS FLX 3M -B

## 2018-08-27 PROCEDURE — C9113 INJ PANTOPRAZOLE SODIUM, VIA: HCPCS | Performed by: UROLOGY

## 2018-08-27 PROCEDURE — 77030008684 HC TU ET CUF COVD -B: Performed by: ANESTHESIOLOGY

## 2018-08-27 PROCEDURE — 74011000250 HC RX REV CODE- 250

## 2018-08-27 PROCEDURE — 74011000250 HC RX REV CODE- 250: Performed by: UROLOGY

## 2018-08-27 PROCEDURE — 77030008467 HC STPLR SKN COVD -B: Performed by: UROLOGY

## 2018-08-27 PROCEDURE — 77030018723 HC ELCTRD BLD COVD -A: Performed by: UROLOGY

## 2018-08-27 PROCEDURE — 77030011278 HC ELECTRD LIG IMPT COVD -F: Performed by: UROLOGY

## 2018-08-27 RX ORDER — EPHEDRINE SULFATE 50 MG/ML
INJECTION, SOLUTION INTRAVENOUS AS NEEDED
Status: DISCONTINUED | OUTPATIENT
Start: 2018-08-27 | End: 2018-08-27

## 2018-08-27 RX ORDER — ALBUMIN HUMAN 50 G/1000ML
SOLUTION INTRAVENOUS
Status: COMPLETED
Start: 2018-08-27 | End: 2018-08-27

## 2018-08-27 RX ORDER — SODIUM CHLORIDE 0.9 % (FLUSH) 0.9 %
5-10 SYRINGE (ML) INJECTION AS NEEDED
Status: DISCONTINUED | OUTPATIENT
Start: 2018-08-27 | End: 2018-08-27 | Stop reason: HOSPADM

## 2018-08-27 RX ORDER — FENTANYL CITRATE 50 UG/ML
INJECTION, SOLUTION INTRAMUSCULAR; INTRAVENOUS AS NEEDED
Status: DISCONTINUED | OUTPATIENT
Start: 2018-08-27 | End: 2018-08-27 | Stop reason: HOSPADM

## 2018-08-27 RX ORDER — LIDOCAINE HYDROCHLORIDE ANHYDROUS AND DEXTROSE MONOHYDRATE .8; 5 G/100ML; G/100ML
INJECTION, SOLUTION INTRAVENOUS
Status: DISCONTINUED | OUTPATIENT
Start: 2018-08-27 | End: 2018-08-27 | Stop reason: HOSPADM

## 2018-08-27 RX ORDER — LIDOCAINE HYDROCHLORIDE 20 MG/ML
INJECTION, SOLUTION EPIDURAL; INFILTRATION; INTRACAUDAL; PERINEURAL AS NEEDED
Status: DISCONTINUED | OUTPATIENT
Start: 2018-08-27 | End: 2018-08-27 | Stop reason: HOSPADM

## 2018-08-27 RX ORDER — NALOXONE HYDROCHLORIDE 0.4 MG/ML
0.4 INJECTION, SOLUTION INTRAMUSCULAR; INTRAVENOUS; SUBCUTANEOUS AS NEEDED
Status: DISCONTINUED | OUTPATIENT
Start: 2018-08-27 | End: 2018-08-31 | Stop reason: HOSPADM

## 2018-08-27 RX ORDER — SODIUM CHLORIDE, SODIUM LACTATE, POTASSIUM CHLORIDE, CALCIUM CHLORIDE 600; 310; 30; 20 MG/100ML; MG/100ML; MG/100ML; MG/100ML
75 INJECTION, SOLUTION INTRAVENOUS CONTINUOUS
Status: DISCONTINUED | OUTPATIENT
Start: 2018-08-27 | End: 2018-08-27 | Stop reason: HOSPADM

## 2018-08-27 RX ORDER — ONDANSETRON 2 MG/ML
4 INJECTION INTRAMUSCULAR; INTRAVENOUS
Status: DISCONTINUED | OUTPATIENT
Start: 2018-08-27 | End: 2018-08-31 | Stop reason: HOSPADM

## 2018-08-27 RX ORDER — NEOSTIGMINE METHYLSULFATE 1 MG/ML
INJECTION INTRAVENOUS AS NEEDED
Status: DISCONTINUED | OUTPATIENT
Start: 2018-08-27 | End: 2018-08-27 | Stop reason: HOSPADM

## 2018-08-27 RX ORDER — HYDROMORPHONE HYDROCHLORIDE 2 MG/ML
0.5 INJECTION, SOLUTION INTRAMUSCULAR; INTRAVENOUS; SUBCUTANEOUS
Status: DISCONTINUED | OUTPATIENT
Start: 2018-08-27 | End: 2018-08-27 | Stop reason: HOSPADM

## 2018-08-27 RX ORDER — LIDOCAINE HYDROCHLORIDE 10 MG/ML
0.1 INJECTION, SOLUTION EPIDURAL; INFILTRATION; INTRACAUDAL; PERINEURAL AS NEEDED
Status: DISCONTINUED | OUTPATIENT
Start: 2018-08-27 | End: 2018-08-27 | Stop reason: HOSPADM

## 2018-08-27 RX ORDER — KETAMINE HYDROCHLORIDE 100 MG/ML
INJECTION, SOLUTION INTRAMUSCULAR; INTRAVENOUS AS NEEDED
Status: DISCONTINUED | OUTPATIENT
Start: 2018-08-27 | End: 2018-08-27 | Stop reason: HOSPADM

## 2018-08-27 RX ORDER — ALBUMIN HUMAN 50 G/1000ML
12.5 SOLUTION INTRAVENOUS ONCE
Status: COMPLETED | OUTPATIENT
Start: 2018-08-27 | End: 2018-08-27

## 2018-08-27 RX ORDER — FENTANYL CITRATE 50 UG/ML
50 INJECTION, SOLUTION INTRAMUSCULAR; INTRAVENOUS AS NEEDED
Status: DISCONTINUED | OUTPATIENT
Start: 2018-08-27 | End: 2018-08-27 | Stop reason: HOSPADM

## 2018-08-27 RX ORDER — MIDAZOLAM HYDROCHLORIDE 1 MG/ML
1 INJECTION, SOLUTION INTRAMUSCULAR; INTRAVENOUS AS NEEDED
Status: DISCONTINUED | OUTPATIENT
Start: 2018-08-27 | End: 2018-08-27 | Stop reason: HOSPADM

## 2018-08-27 RX ORDER — ACETAMINOPHEN 500 MG
1000 TABLET ORAL ONCE
Status: COMPLETED | OUTPATIENT
Start: 2018-08-27 | End: 2018-08-27

## 2018-08-27 RX ORDER — DEXMEDETOMIDINE HYDROCHLORIDE 4 UG/ML
INJECTION, SOLUTION INTRAVENOUS
Status: DISCONTINUED | OUTPATIENT
Start: 2018-08-27 | End: 2018-08-27 | Stop reason: HOSPADM

## 2018-08-27 RX ORDER — GABAPENTIN 300 MG/1
600 CAPSULE ORAL ONCE
Status: COMPLETED | OUTPATIENT
Start: 2018-08-27 | End: 2018-08-27

## 2018-08-27 RX ORDER — CELECOXIB 200 MG/1
200 CAPSULE ORAL ONCE
Status: COMPLETED | OUTPATIENT
Start: 2018-08-27 | End: 2018-08-27

## 2018-08-27 RX ORDER — TRAMADOL HYDROCHLORIDE 50 MG/1
50 TABLET ORAL
Status: DISCONTINUED | OUTPATIENT
Start: 2018-08-27 | End: 2018-08-31 | Stop reason: HOSPADM

## 2018-08-27 RX ORDER — KETOROLAC TROMETHAMINE 30 MG/ML
15 INJECTION, SOLUTION INTRAMUSCULAR; INTRAVENOUS EVERY 6 HOURS
Status: DISCONTINUED | OUTPATIENT
Start: 2018-08-27 | End: 2018-08-28

## 2018-08-27 RX ORDER — ONDANSETRON 2 MG/ML
4 INJECTION INTRAMUSCULAR; INTRAVENOUS AS NEEDED
Status: DISCONTINUED | OUTPATIENT
Start: 2018-08-27 | End: 2018-08-27 | Stop reason: HOSPADM

## 2018-08-27 RX ORDER — ENOXAPARIN SODIUM 100 MG/ML
40 INJECTION SUBCUTANEOUS EVERY 24 HOURS
Status: DISCONTINUED | OUTPATIENT
Start: 2018-08-27 | End: 2018-08-31 | Stop reason: HOSPADM

## 2018-08-27 RX ORDER — SODIUM CHLORIDE 0.9 % (FLUSH) 0.9 %
5-10 SYRINGE (ML) INJECTION EVERY 8 HOURS
Status: DISCONTINUED | OUTPATIENT
Start: 2018-08-27 | End: 2018-08-27 | Stop reason: HOSPADM

## 2018-08-27 RX ORDER — GLYCOPYRROLATE 0.2 MG/ML
INJECTION INTRAMUSCULAR; INTRAVENOUS AS NEEDED
Status: DISCONTINUED | OUTPATIENT
Start: 2018-08-27 | End: 2018-08-27 | Stop reason: HOSPADM

## 2018-08-27 RX ORDER — OXYCODONE HYDROCHLORIDE 5 MG/1
5 TABLET ORAL
Status: DISCONTINUED | OUTPATIENT
Start: 2018-08-27 | End: 2018-08-28

## 2018-08-27 RX ORDER — PROPOFOL 10 MG/ML
INJECTION, EMULSION INTRAVENOUS AS NEEDED
Status: DISCONTINUED | OUTPATIENT
Start: 2018-08-27 | End: 2018-08-27 | Stop reason: HOSPADM

## 2018-08-27 RX ORDER — SODIUM CHLORIDE 0.9 % (FLUSH) 0.9 %
5-10 SYRINGE (ML) INJECTION AS NEEDED
Status: DISCONTINUED | OUTPATIENT
Start: 2018-08-27 | End: 2018-08-31 | Stop reason: HOSPADM

## 2018-08-27 RX ORDER — SODIUM CHLORIDE, SODIUM LACTATE, POTASSIUM CHLORIDE, CALCIUM CHLORIDE 600; 310; 30; 20 MG/100ML; MG/100ML; MG/100ML; MG/100ML
INJECTION, SOLUTION INTRAVENOUS
Status: DISCONTINUED | OUTPATIENT
Start: 2018-08-27 | End: 2018-08-27 | Stop reason: HOSPADM

## 2018-08-27 RX ORDER — MIDAZOLAM HYDROCHLORIDE 1 MG/ML
INJECTION, SOLUTION INTRAMUSCULAR; INTRAVENOUS AS NEEDED
Status: DISCONTINUED | OUTPATIENT
Start: 2018-08-27 | End: 2018-08-27 | Stop reason: HOSPADM

## 2018-08-27 RX ORDER — SODIUM CHLORIDE, SODIUM LACTATE, POTASSIUM CHLORIDE, CALCIUM CHLORIDE 600; 310; 30; 20 MG/100ML; MG/100ML; MG/100ML; MG/100ML
50 INJECTION, SOLUTION INTRAVENOUS CONTINUOUS
Status: DISCONTINUED | OUTPATIENT
Start: 2018-08-27 | End: 2018-08-27 | Stop reason: HOSPADM

## 2018-08-27 RX ORDER — ALBUMIN HUMAN 50 G/1000ML
25 SOLUTION INTRAVENOUS ONCE
Status: DISCONTINUED | OUTPATIENT
Start: 2018-08-27 | End: 2018-08-27 | Stop reason: HOSPADM

## 2018-08-27 RX ORDER — MAGNESIUM SULFATE HEPTAHYDRATE 40 MG/ML
INJECTION, SOLUTION INTRAVENOUS AS NEEDED
Status: DISCONTINUED | OUTPATIENT
Start: 2018-08-27 | End: 2018-08-27 | Stop reason: HOSPADM

## 2018-08-27 RX ORDER — LORAZEPAM 0.5 MG/1
0.5 TABLET ORAL
Status: DISCONTINUED | OUTPATIENT
Start: 2018-08-27 | End: 2018-08-31 | Stop reason: HOSPADM

## 2018-08-27 RX ORDER — FENTANYL CITRATE 50 UG/ML
25 INJECTION, SOLUTION INTRAMUSCULAR; INTRAVENOUS
Status: DISCONTINUED | OUTPATIENT
Start: 2018-08-27 | End: 2018-08-27 | Stop reason: HOSPADM

## 2018-08-27 RX ORDER — ROCURONIUM BROMIDE 10 MG/ML
INJECTION, SOLUTION INTRAVENOUS AS NEEDED
Status: DISCONTINUED | OUTPATIENT
Start: 2018-08-27 | End: 2018-08-27 | Stop reason: HOSPADM

## 2018-08-27 RX ORDER — PHENYLEPHRINE HCL IN 0.9% NACL 0.4MG/10ML
SYRINGE (ML) INTRAVENOUS AS NEEDED
Status: DISCONTINUED | OUTPATIENT
Start: 2018-08-27 | End: 2018-08-27 | Stop reason: HOSPADM

## 2018-08-27 RX ORDER — ALVIMOPAN 12 MG/1
12 CAPSULE ORAL ONCE
Status: COMPLETED | OUTPATIENT
Start: 2018-08-27 | End: 2018-08-27

## 2018-08-27 RX ORDER — ACETAMINOPHEN 10 MG/ML
1000 INJECTION, SOLUTION INTRAVENOUS EVERY 6 HOURS
Status: COMPLETED | OUTPATIENT
Start: 2018-08-27 | End: 2018-08-28

## 2018-08-27 RX ORDER — CELECOXIB 100 MG/1
100 CAPSULE ORAL 2 TIMES DAILY
Status: DISCONTINUED | OUTPATIENT
Start: 2018-08-28 | End: 2018-08-31 | Stop reason: HOSPADM

## 2018-08-27 RX ORDER — SODIUM CHLORIDE, SODIUM LACTATE, POTASSIUM CHLORIDE, CALCIUM CHLORIDE 600; 310; 30; 20 MG/100ML; MG/100ML; MG/100ML; MG/100ML
75 INJECTION, SOLUTION INTRAVENOUS CONTINUOUS
Status: DISPENSED | OUTPATIENT
Start: 2018-08-27 | End: 2018-08-30

## 2018-08-27 RX ORDER — DEXAMETHASONE SODIUM PHOSPHATE 4 MG/ML
INJECTION, SOLUTION INTRA-ARTICULAR; INTRALESIONAL; INTRAMUSCULAR; INTRAVENOUS; SOFT TISSUE AS NEEDED
Status: DISCONTINUED | OUTPATIENT
Start: 2018-08-27 | End: 2018-08-27 | Stop reason: HOSPADM

## 2018-08-27 RX ORDER — SODIUM CHLORIDE 0.9 % (FLUSH) 0.9 %
5-10 SYRINGE (ML) INJECTION EVERY 8 HOURS
Status: DISCONTINUED | OUTPATIENT
Start: 2018-08-27 | End: 2018-08-31 | Stop reason: HOSPADM

## 2018-08-27 RX ORDER — BUPIVACAINE HYDROCHLORIDE AND EPINEPHRINE 2.5; 5 MG/ML; UG/ML
INJECTION, SOLUTION EPIDURAL; INFILTRATION; INTRACAUDAL; PERINEURAL AS NEEDED
Status: DISCONTINUED | OUTPATIENT
Start: 2018-08-27 | End: 2018-08-27 | Stop reason: HOSPADM

## 2018-08-27 RX ORDER — ALVIMOPAN 12 MG/1
12 CAPSULE ORAL 2 TIMES DAILY
Status: DISCONTINUED | OUTPATIENT
Start: 2018-08-28 | End: 2018-08-31 | Stop reason: HOSPADM

## 2018-08-27 RX ORDER — ALBUMIN HUMAN 50 G/1000ML
SOLUTION INTRAVENOUS AS NEEDED
Status: DISCONTINUED | OUTPATIENT
Start: 2018-08-27 | End: 2018-08-27 | Stop reason: HOSPADM

## 2018-08-27 RX ORDER — ONDANSETRON 2 MG/ML
INJECTION INTRAMUSCULAR; INTRAVENOUS AS NEEDED
Status: DISCONTINUED | OUTPATIENT
Start: 2018-08-27 | End: 2018-08-27 | Stop reason: HOSPADM

## 2018-08-27 RX ADMIN — ALBUMIN HUMAN 250 ML: 50 SOLUTION INTRAVENOUS at 10:53

## 2018-08-27 RX ADMIN — MIDAZOLAM HYDROCHLORIDE 2 MG: 1 INJECTION, SOLUTION INTRAMUSCULAR; INTRAVENOUS at 08:31

## 2018-08-27 RX ADMIN — SODIUM CHLORIDE 40 MG: 9 INJECTION, SOLUTION INTRAMUSCULAR; INTRAVENOUS; SUBCUTANEOUS at 22:30

## 2018-08-27 RX ADMIN — NEOSTIGMINE METHYLSULFATE 3 MG: 1 INJECTION INTRAVENOUS at 13:34

## 2018-08-27 RX ADMIN — CELECOXIB 200 MG: 200 CAPSULE ORAL at 08:28

## 2018-08-27 RX ADMIN — FENTANYL CITRATE 25 MCG: 50 INJECTION, SOLUTION INTRAMUSCULAR; INTRAVENOUS at 08:31

## 2018-08-27 RX ADMIN — DEXAMETHASONE SODIUM PHOSPHATE 8 MG: 4 INJECTION, SOLUTION INTRA-ARTICULAR; INTRALESIONAL; INTRAMUSCULAR; INTRAVENOUS; SOFT TISSUE at 09:07

## 2018-08-27 RX ADMIN — LIDOCAINE HYDROCHLORIDE ANHYDROUS AND DEXTROSE MONOHYDRATE 2 MG/KG/HR: .8; 5 INJECTION, SOLUTION INTRAVENOUS at 09:30

## 2018-08-27 RX ADMIN — ROCURONIUM BROMIDE 10 MG: 10 INJECTION, SOLUTION INTRAVENOUS at 10:04

## 2018-08-27 RX ADMIN — ROCURONIUM BROMIDE 10 MG: 10 INJECTION, SOLUTION INTRAVENOUS at 12:36

## 2018-08-27 RX ADMIN — KETAMINE HYDROCHLORIDE 40 MG: 100 INJECTION, SOLUTION INTRAMUSCULAR; INTRAVENOUS at 09:40

## 2018-08-27 RX ADMIN — FENTANYL CITRATE 25 MCG: 50 INJECTION, SOLUTION INTRAMUSCULAR; INTRAVENOUS at 08:35

## 2018-08-27 RX ADMIN — SODIUM CHLORIDE, SODIUM LACTATE, POTASSIUM CHLORIDE, CALCIUM CHLORIDE: 600; 310; 30; 20 INJECTION, SOLUTION INTRAVENOUS at 09:30

## 2018-08-27 RX ADMIN — ENOXAPARIN SODIUM 40 MG: 100 INJECTION SUBCUTANEOUS at 18:56

## 2018-08-27 RX ADMIN — KETOROLAC TROMETHAMINE 15 MG: 30 INJECTION, SOLUTION INTRAMUSCULAR; INTRAVENOUS at 17:27

## 2018-08-27 RX ADMIN — Medication 80 MCG: at 10:51

## 2018-08-27 RX ADMIN — SODIUM CHLORIDE, SODIUM LACTATE, POTASSIUM CHLORIDE, AND CALCIUM CHLORIDE 75 ML/HR: 600; 310; 30; 20 INJECTION, SOLUTION INTRAVENOUS at 17:44

## 2018-08-27 RX ADMIN — ALBUMIN (HUMAN) 12.5 G: 12.5 INJECTION, SOLUTION INTRAVENOUS at 15:46

## 2018-08-27 RX ADMIN — Medication 80 MCG: at 09:25

## 2018-08-27 RX ADMIN — ROCURONIUM BROMIDE 20 MG: 10 INJECTION, SOLUTION INTRAVENOUS at 11:36

## 2018-08-27 RX ADMIN — ONDANSETRON 4 MG: 2 INJECTION INTRAMUSCULAR; INTRAVENOUS at 13:24

## 2018-08-27 RX ADMIN — SODIUM CHLORIDE, SODIUM LACTATE, POTASSIUM CHLORIDE, AND CALCIUM CHLORIDE 75 ML/HR: 600; 310; 30; 20 INJECTION, SOLUTION INTRAVENOUS at 16:00

## 2018-08-27 RX ADMIN — Medication 10 ML: at 22:30

## 2018-08-27 RX ADMIN — MAGNESIUM SULFATE HEPTAHYDRATE 2 G: 40 INJECTION, SOLUTION INTRAVENOUS at 09:05

## 2018-08-27 RX ADMIN — SODIUM CHLORIDE, SODIUM LACTATE, POTASSIUM CHLORIDE, CALCIUM CHLORIDE: 600; 310; 30; 20 INJECTION, SOLUTION INTRAVENOUS at 08:41

## 2018-08-27 RX ADMIN — ALBUMIN HUMAN 250 ML: 50 SOLUTION INTRAVENOUS at 10:12

## 2018-08-27 RX ADMIN — FENTANYL CITRATE 50 MCG: 50 INJECTION, SOLUTION INTRAMUSCULAR; INTRAVENOUS at 09:07

## 2018-08-27 RX ADMIN — ALBUMIN HUMAN 12.5 G: 50 SOLUTION INTRAVENOUS at 15:46

## 2018-08-27 RX ADMIN — ALVIMOPAN 12 MG: 12 CAPSULE ORAL at 08:28

## 2018-08-27 RX ADMIN — SODIUM CHLORIDE, SODIUM LACTATE, POTASSIUM CHLORIDE, CALCIUM CHLORIDE: 600; 310; 30; 20 INJECTION, SOLUTION INTRAVENOUS at 11:28

## 2018-08-27 RX ADMIN — ROCURONIUM BROMIDE 50 MG: 10 INJECTION, SOLUTION INTRAVENOUS at 09:08

## 2018-08-27 RX ADMIN — ALBUMIN (HUMAN) 12.5 G: 12.5 INJECTION, SOLUTION INTRAVENOUS at 15:21

## 2018-08-27 RX ADMIN — ALBUMIN HUMAN 12.5 G: 50 SOLUTION INTRAVENOUS at 15:21

## 2018-08-27 RX ADMIN — ROCURONIUM BROMIDE 10 MG: 10 INJECTION, SOLUTION INTRAVENOUS at 10:42

## 2018-08-27 RX ADMIN — SODIUM CHLORIDE, SODIUM LACTATE, POTASSIUM CHLORIDE, AND CALCIUM CHLORIDE 75 ML/HR: 600; 310; 30; 20 INJECTION, SOLUTION INTRAVENOUS at 08:15

## 2018-08-27 RX ADMIN — PROPOFOL 100 MG: 10 INJECTION, EMULSION INTRAVENOUS at 09:07

## 2018-08-27 RX ADMIN — LIDOCAINE HYDROCHLORIDE 100 MG: 20 INJECTION, SOLUTION EPIDURAL; INFILTRATION; INTRACAUDAL; PERINEURAL at 09:07

## 2018-08-27 RX ADMIN — GLYCOPYRROLATE 0.4 MG: 0.2 INJECTION INTRAMUSCULAR; INTRAVENOUS at 13:34

## 2018-08-27 RX ADMIN — ACETAMINOPHEN 1000 MG: 500 TABLET ORAL at 08:28

## 2018-08-27 RX ADMIN — KETOROLAC TROMETHAMINE 15 MG: 30 INJECTION, SOLUTION INTRAMUSCULAR; INTRAVENOUS at 23:28

## 2018-08-27 RX ADMIN — ACETAMINOPHEN 1000 MG: 10 INJECTION, SOLUTION INTRAVENOUS at 22:30

## 2018-08-27 RX ADMIN — Medication 80 MCG: at 09:16

## 2018-08-27 RX ADMIN — DEXMEDETOMIDINE HYDROCHLORIDE 0.5 MCG/KG/HR: 4 INJECTION, SOLUTION INTRAVENOUS at 09:28

## 2018-08-27 RX ADMIN — FENTANYL CITRATE 25 MCG: 50 INJECTION, SOLUTION INTRAMUSCULAR; INTRAVENOUS at 13:46

## 2018-08-27 RX ADMIN — Medication 10 ML: at 14:00

## 2018-08-27 RX ADMIN — GABAPENTIN 600 MG: 300 CAPSULE ORAL at 08:28

## 2018-08-27 RX ADMIN — ROCURONIUM BROMIDE 10 MG: 10 INJECTION, SOLUTION INTRAVENOUS at 11:07

## 2018-08-27 RX ADMIN — ACETAMINOPHEN 1000 MG: 10 INJECTION, SOLUTION INTRAVENOUS at 16:44

## 2018-08-27 NOTE — OP NOTES
[address] [place] [address] REPORT    Juni Lockhart  MR#: [id number]  : 1945  ACCOUNT #: [de-identified]   DATE OF SERVICE: 2018    PREOPERATIVE DIAGNOSES:  Bladder cancer. POSTOPERATIVE DIAGNOSIS:  Bladder cancer. PROCEDURE PERFORMED:  Radical cystoprostatectomy, urethrectomy, extended bilateral pelvic lymphadenectomy, ileal conduit, and urinary diversion. ANESTHESIA:  General.    IMPLANTS:  None. SURGEON:  Price Thakur MD    ASSISTANT:  Belinda Velazco. COMPLICATIONS:  None. ESTIMATED BLOOD LOSS:  500 mL. SPECIMENS REMOVED:  Bladder, prostate, urethra, lymph nodes, ureters. PROCEDURE:  After anesthesia, a midline incision was made from the umbilicus down. The abdomen was explored. There was no evidence of palpable metastatic disease. The liver was smooth. There was no palpable adenopathy. The peritoneum was scored around the bladder. The vas was clipped and divided on both sides. The spermatic cord was lateralized bilaterally. The ureters were identified and traced to the level of the bladder, clipped, and divided. Frozen sections on the ureters were negative. The left ureter was mobilized and brought underneath the sigmoid into the right lower quadrant. The lateral pedicles were taken down with a LigaSure device. The endopelvic fascia was cleared and opened on both sides. The dorsal venous bundle was suture ligated with 2-0 chromic. Back bleeders were sutured and the dorsal vein was divided. The urethra was freed. A space was created beneath the prostate gland. The rest of the pedicles were taken down with electrocautery and Hemoclips. An incision was made in the perineum at the base of the scrotum. The urethra was dissected free from the corporal bodies proximally to the level of the fossa navicularis, where it was divided.   It was then dissected proximally to the prostate and the specimen was passed off the field en bloc intact. Hemostasis was obtained with electrocautery. A Reyes catheter was inserted through the perineum to act as a drain and pressure was applied using the balloon on the dorsal venous complex for some mild oozing. The perineal incision was closed in multiple layers with 2-0 chromic. Extended pelvic lymphadenectomy was done on both sides. The limits included the iliac artery, the bifurcation of the iliacs proximally, the obturator structures inferiorly, and the femoral canal distally. Next, 15 cm of terminal ileum were isolated. The bowel was reconnected with a ALEKSEY stapling device. The ureters were connected to the ileal conduit with a running spatulated end-to-side anastomosis using 4-0 double arm Monocryl over a 7-Slovenian urinary diversion stent. The connections were tested under water pressure and there was no leak. The conduit was brought through a circular incision in the right mid abdomen that had been marked by the enterostomal therapist.  Bedelia Pavel sutures were placed internally to prevent parastomal hernia. A drain was brought out on the left side and sutured. Marcaine was injected in the fascia and skin. The abdominal cavity was irrigated with antibiotic solution and closed with a running #1 PDS. Skin edges were closed with skin staples. The stoma was matured with a rosebud technique using 3-0 Vicryl. Stents and drains were secured with nylon sutures. Operating time was 3 hours and 45 minutes.       Merwyn Alpers, MD [facility] / KWADWO  D: 08/27/2018 15:05     T: 08/27/2018 18:59  JOB #: [id number]

## 2018-08-27 NOTE — ANESTHESIA POSTPROCEDURE EVALUATION
Post-Anesthesia Evaluation and Assessment    Patient: Vipul Mead MRN: [id number]  SSN: [id number]    YOB: 1945  Age: 74 y.o. Sex: male       Cardiovascular Function/Vital Signs  Visit Vitals    /57    Pulse 71    Temp 36.4 °C (97.5 °F)    Resp 13    Ht 5' 8\" (1.727 m)    Wt 80.9 kg (178 lb 6 oz)    SpO2 97%    BMI 27.12 kg/m2       Patient is status post general anesthesia for Procedure(s):  RADICAL CYSTECTOMY PROSTATECTOMY URETHRECTOMY BILATERAL PELVIC LYMPH NODE DISECTION WITH ILEAL CONDUIT DIVERSION (E.R.A.S.). Nausea/Vomiting: None    Postoperative hydration reviewed and adequate. Pain:  Pain Scale 1: FLACC (08/27/18 1411)  Pain Intensity 1: 0 (08/27/18 1411)   Managed    Neurological Status:   Neuro (WDL): Exceptions to WDL (08/27/18 1411)  Neuro  Neurologic State: Sleeping (08/27/18 1411)   At baseline    Mental Status and Level of Consciousness: Arousable    Pulmonary Status:   O2 Device: Nasal cannula;Oral airway (08/27/18 1411)   Adequate oxygenation and airway patent    Complications related to anesthesia: None    Post-anesthesia assessment completed.  No concerns    Signed By: Catalina Mckinley MD     August 27, 2018

## 2018-08-27 NOTE — ANESTHESIA PREPROCEDURE EVALUATION
Anesthetic History   No history of anesthetic complications            Review of Systems / Medical History  Patient summary reviewed, nursing notes reviewed and pertinent labs reviewed    Pulmonary  Within defined limits                 Neuro/Psych   Within defined limits           Cardiovascular    Hypertension                   GI/Hepatic/Renal     GERD           Endo/Other        Arthritis     Other Findings   Comments: Bladder cancer         Physical Exam    Airway  Mallampati: I  TM Distance: > 6 cm  Neck ROM: normal range of motion   Mouth opening: Normal     Cardiovascular    Rhythm: regular  Rate: normal         Dental  No notable dental hx       Pulmonary  Breath sounds clear to auscultation               Abdominal         Other Findings            Anesthetic Plan    ASA: 3  Anesthesia type: general    Monitoring Plan: CVP      Induction: Intravenous  Anesthetic plan and risks discussed with: Patient      ERAS protocol.   Pt refuses epidural.

## 2018-08-27 NOTE — IP AVS SNAPSHOT
110 [facility] [place] [address] [place] [address] 
[phone] Patient: Rani Pizano MRN: [id number] OBE:[birth year] A check nel indicates which time of day the medication should be taken. My Medications START taking these medications Instructions Each Dose to Equal  
 Morning Noon Evening Bedtime  
 cephALEXin 500 mg capsule Commonly known as:  Godfrey Fogo Your last dose was: Your next dose is: Take 1 Cap by mouth four (4) times daily for 5 days. 500 mg  
    
   
   
   
  
 traMADol 50 mg tablet Commonly known as:  ULTRAM  
   
Your last dose was: Your next dose is: Take 1 Tab by mouth every six (6) hours as needed for Pain. Max Daily Amount: 200 mg.  
 50 mg CONTINUE taking these medications Instructions Each Dose to Equal  
 Morning Noon Evening Bedtime  
 aspirin delayed-release 81 mg tablet Your last dose was: Your next dose is: Take 81 mg by mouth daily. 81 mg  
    
   
   
   
  
 calcium carbonate 500 mg calcium (1,250 mg) tablet Commonly known as:  OS-BLANE Your last dose was: Your next dose is: Take 2 Tabs by mouth daily. 2 Tab Cholecalciferol (Vitamin D3) 2,000 unit Cap capsule Commonly known as:  VITAMIN D3 Your last dose was: Your next dose is: Take 4,000 Units by mouth daily. 4000 Units LIPITOR 20 mg tablet Generic drug:  atorvastatin Your last dose was: Your next dose is: Take 20 mg by mouth nightly. 20 mg  
    
   
   
   
  
 lisinopril 10 mg tablet Commonly known as:  Astrid Cantu Your last dose was: Your next dose is: Take 10 mg by mouth nightly. 10 mg  
    
   
   
   
  
 SUPER B COMPLEX PO Your last dose was: Your next dose is: Take 1 Tab by mouth daily. 1 Tab TYLENOL EXTRA STRENGTH 500 mg tablet Generic drug:  acetaminophen Your last dose was: Your next dose is: Take 1,000 mg by mouth every six (6) hours as needed for Pain. 1000 mg VITALINE COQ10 PO Your last dose was: Your next dose is: Take 200 mg by mouth daily. 200 mg  
    
   
   
   
  
 VITAMIN C 500 mg tablet Generic drug:  ascorbic acid (vitamin C) Your last dose was: Your next dose is: Take 500 mg by mouth daily. 500 mg Where to Get Your Medications Information on where to get these meds will be given to you by the nurse or doctor. ! Ask your nurse or doctor about these medications  
  cephALEXin 500 mg capsule  
 traMADol 50 mg tablet

## 2018-08-27 NOTE — ROUTINE PROCESS
Patient: Cheri Anglin MRN: [id number]  SSN: [id number]   YOB: 1945  Age: 74 y.o. Sex: male     Patient is status post Procedure(s):  RADICAL CYSTECTOMY PROSTATECTOMY URETHRECTOMY BILATERAL PELVIC LYMPH NODE DISECTION WITH ILEAL CONDUIT DIVERSION (E.R.A.S.). Surgeon(s) and Role:     * Esequiel Webber MD - Primary    Local/Dose/Irrigation: Baci/NACL irrigation  . 25% Marcaine with Epi 52ml                Quad Lumen 08/27/18 Right Internal jugular (Active)        Peripheral IV 08/27/18 Left Hand (Active)          Drain Reyes 20 FR, inserted, as perineum drain, 20cc saline in balloon 08/27/18  Other (comment) (Active)       Hemal Drain #1 08/27/18 Left;Upper Abdomen (Active)      Airway - Endotracheal Tube 08/27/18 (Active)       Airway - Endotracheal Tube 08/27/18 Oral (Active)                   Dressing/Packing:  Wound Perineum-DRESSING TYPE: 4 x 4;Scrotal support;ABD pad (Durapore tape) (08/27/18 1338)    Other: Three surgical incisions; one stoma, one on abdomen and one at scrotum

## 2018-08-27 NOTE — PERIOP NOTES
TRANSFER - OUT REPORT:    Verbal report given to  Mili Hoyos RN(name) on [employer]  being transferred to [facility](unit) for routine post - op       Report consisted of patients Situation, Background, Assessment and   Recommendations(SBAR). Time Pre op antibiotic given: Cefoteten  Anesthesia Stop time: 14:24  Reyes Present on Transfer to floor:no  Order for Reyes on Chart:no  Discharge Prescriptions with Chart:no    Information from the following report(s) SBAR, Kardex, OR Summary, Procedure Summary, Intake/Output and MAR was reviewed with the receiving nurse. Opportunity for questions and clarification was provided. Is the patient on 02? YES       L/Min        Other     Is the patient on a monitor? NO    Is the nurse transporting with the patient? NO    Surgical Waiting Area notified of patient's transfer from PACU? YES      The following personal items collected during your admission accompanied patient upon transfer:   Dental Appliance: Dental Appliances: None  Vision:    Hearing Aid:    Jewelry:    Clothing: Clothing: Other (comment) (clothing)  Other Valuables:    Valuables sent to safe:      Checked with Dr. Andrea Saenz about order for Lidocaine Drip. He said it is not part of his ERAS protocol. And not to restart it.

## 2018-08-27 NOTE — WOUND CARE
Stoma Marking Note:     Type of ostomy scheduled: ERAS Ileal Conduit by Dr. Neel Diallo. Introduced self and services to patient. Patient able to verbalize knowledge of procedure consistent with consent. Stoma site marked with surgical marker in RLQ  Stoma site chosen is in the patients visual field and within the rectus muscle while avoiding the following: umbilicus, wrinkles, dips, skin folds, rib cage, iliac crest and belt/pants/underwear line. Site was assessed in the lying, sitting and standing positions. Abdominal topography:  Crease at the level of the umbilicus. Marked below the crease  Patient understands that the final location will be determined by the surgeon and the site is only a guideline. Pt concerns discussed: none    Plan to follow after surgery for teaching. Will likely need home health care for further teaching after discharge.     Mna MERCHANTN RN [facility], [facility].  Wound Care   Office [phone]  Pager [phone]

## 2018-08-27 NOTE — BRIEF OP NOTE
BRIEF OPERATIVE NOTE    Date of Procedure: 8/27/2018   Preoperative Diagnosis: BLADDER CANCER  Postoperative Diagnosis: BLADDER CANCER    Procedure(s):  RADICAL CYSTECTOMY PROSTATECTOMY URETHRECTOMY BILATERAL PELVIC LYMPH NODE DISECTION WITH ILEAL CONDUIT DIVERSION (E.R.A.S.)  Surgeon(s) and Role:     * Mic Wetzel MD - Primary         Surgical Assistant: LEON grubbs    Surgical Staff:  Circ-1: John Robertson RN  Circ-Relief: Jessica Briscoe RN  Circ-Intern: Naatlie Walls RN  Scrub Tech-1: Glee Si  Scrub Tech-2: Joanna Home  Scrub RN-Relief: Freedom Gates RN  Surg Asst-1: Sylwia Pacheco  Event Time In   Incision Start 7772   Incision Close 1328     Anesthesia: General   Estimated Blood Loss: 500  Specimens:   ID Type Source Tests Collected by Time Destination   1 : Right Ureter Proximal Frozen Section URETER, RIGHT  Mic Wetzel MD 8/27/2018 9016 Pathology   2 : Left Ureter Proximal  Frozen Section URETER, LEFT  Mic Wetzel MD 8/27/2018 1013 Pathology   3 : Bladder, Prostate, Seminal Vesicles and Urethra Fresh OTHER (use comments)  Mic Wetzel MD 8/27/2018 1126 Pathology   4 : Right Pelvic Lymph Node Fresh Lymph Node  Mic Wetzel MD 8/27/2018 1136 Pathology   5 : Left Pelvic Lymph Node Fresh Lymph Node  Mic Wetzel MD 8/27/2018 1138 Pathology      Findings: no evid met dis   Complications: 0  Implants: * No implants in log *

## 2018-08-27 NOTE — ANESTHESIA PROCEDURE NOTES
Central Line Placement    Start time: 8/27/2018 8:40 AM  End time: 8/27/2018 9:00 AM  Performed by: Wander Bidding by: Sunita Ferrer     Indications: vascular access and central pressure monitoring  Preanesthetic Checklist: patient identified, risks and benefits discussed, anesthesia consent, site marked, patient being monitored and timeout performed    Timeout Time: 08:39       Pre-procedure: All elements of maximal sterile barrier technique followed?  Yes    2% Chlorhexidine for cutaneous antisepsis, Hand hygiene performed prior to catheter insertion and Ultrasound guidance    Sterile Ultrasound Technique followed?: Yes            Procedure:   Prep:  ChloraPrep  Location:  Internal jugular  Orientation:  Right  Patient position:  Trendelenburg  Catheter type:  Quad lumen    Catheter length:  20 cm  Number of attempts:  1  Successful placement: Yes      Assessment:   Post-procedure:  Catheter secured, sterile dressing applied and sterile dressing with CHG applied  Assessment:  Free fluid flow, blood return through all ports and guidewire removal verified  Insertion:  Uncomplicated  Patient tolerance:  Patient tolerated the procedure well with no immediate complications

## 2018-08-27 NOTE — IP AVS SNAPSHOT
1111 [facility] [address] [place] [address] 
[phone] Patient: Vipul Mead MRN: [id number] MTS:3/49/8275 About your hospitalization You were admitted on:  August 27, 2018 You last received care in the:  [facility] [id number] You were discharged on:  August 31, 2018 Why you were hospitalized Your primary diagnosis was:  Not on File Your diagnoses also included:  Bladder Cancer (Hcc) Follow-up Information Follow up With Details Comments Contact Info AT [facility] nurse for urostomy care/education. Please call agency if you have not been contacted by a liaison within 24 hours of hospital discharge. [address] [facility] office [phone] Karly Hills MD   [facility] [address] 
[phone] Janneth Oh MD On 9/7/2018 ERAS Follow Up Appointment: 9/7/18 at 8:30AM.   [facility] 200 [address] [facility] 
[phone] Discharge Orders None A check nel indicates which time of day the medication should be taken. My Medications START taking these medications Instructions Each Dose to Equal  
 Morning Noon Evening Bedtime  
 cephALEXin 500 mg capsule Commonly known as:  Suzen Gaetano Your last dose was: Your next dose is: Take 1 Cap by mouth four (4) times daily for 5 days. 500 mg  
    
   
   
   
  
 traMADol 50 mg tablet Commonly known as:  ULTRAM  
   
Your last dose was: Your next dose is: Take 1 Tab by mouth every six (6) hours as needed for Pain. Max Daily Amount: 200 mg.  
 50 mg CONTINUE taking these medications Instructions Each Dose to Equal  
 Morning Noon Evening Bedtime  
 aspirin delayed-release 81 mg tablet Your last dose was: Your next dose is: Take 81 mg by mouth daily.   
 81 mg  
    
   
   
   
  
 calcium carbonate 500 mg calcium (1,250 mg) tablet Commonly known as:  OS-BLANE Your last dose was: Your next dose is: Take 2 Tabs by mouth daily. 2 Tab Cholecalciferol (Vitamin D3) 2,000 unit Cap capsule Commonly known as:  VITAMIN D3 Your last dose was: Your next dose is: Take 4,000 Units by mouth daily. 4000 Units LIPITOR 20 mg tablet Generic drug:  atorvastatin Your last dose was: Your next dose is: Take 20 mg by mouth nightly. 20 mg  
    
   
   
   
  
 lisinopril 10 mg tablet Commonly known as:  Richard Bridges Your last dose was: Your next dose is: Take 10 mg by mouth nightly. 10 mg  
    
   
   
   
  
 SUPER B COMPLEX PO Your last dose was: Your next dose is: Take 1 Tab by mouth daily. 1 Tab TYLENOL EXTRA STRENGTH 500 mg tablet Generic drug:  acetaminophen Your last dose was: Your next dose is: Take 1,000 mg by mouth every six (6) hours as needed for Pain. 1000 mg VITALINE COQ10 PO Your last dose was: Your next dose is: Take 200 mg by mouth daily. 200 mg  
    
   
   
   
  
 VITAMIN C 500 mg tablet Generic drug:  ascorbic acid (vitamin C) Your last dose was: Your next dose is: Take 500 mg by mouth daily. 500 mg Where to Get Your Medications Information on where to get these meds will be given to you by the nurse or doctor. ! Ask your nurse or doctor about these medications  
  cephALEXin 500 mg capsule  
 traMADol 50 mg tablet Opioid Education  Prescription Opioids: What You Need to Know: 
 
Prescription opioids can be used to help relieve moderate-to-severe pain and are often prescribed following a surgery or injury, or for certain health conditions. These medications can be an important part of treatment but also come with serious risks. Opioids are strong pain medicines. Examples include hydrocodone, oxycodone, fentanyl, and morphine. Heroin is an example of an illegal opioid. It is important to work with your health care provider to make sure you are getting the safest, most effective care. WHAT ARE THE RISKS AND SIDE EFFECTS OF OPIOID USE? Prescription opioids carry serious risks of addiction and overdose, especially with prolonged use. An opioid overdose, often marked by slow breathing, can cause sudden death. The use of prescription opioids can have a number of side effects as well, even when taken as directed. · Tolerance-meaning you might need to take more of a medication for the same pain relief · Physical dependence-meaning you have symptoms of withdrawal when the medication is stopped. Withdrawal symptoms can include nausea, sweating, chills, diarrhea, stomach cramps, and muscle aches. Withdrawal can last up to several weeks, depending on which drug you took and how long you took it. · Increased sensitivity to pain · Constipation · Nausea, vomiting, and dry mouth · Sleepiness and dizziness · Confusion · Depression · Low levels of testosterone that can result in lower sex drive, energy, and strength · Itching and sweating RISKS ARE GREATER WITH:      
· History of drug misuse, substance use disorder, or overdose · Mental health conditions (such as depression or anxiety) · Sleep apnea · Older age (72 years or older) · Pregnancy Avoid alcohol while taking prescription opioids. Also, unless specifically advised by your health care provider, medications to avoid include: · Benzodiazepines (such as Xanax or Valium) · Muscle relaxants (such as Soma or Flexeril) · Hypnotics (such as Ambien or Lunesta) · Other prescription opioids KNOW YOUR OPTIONS Talk to your health care provider about ways to manage your pain that don't involve prescription opioids. Some of these options may actually work better and have fewer risks and side effects. Options may include: 
· Pain relievers such as acetaminophen, ibuprofen, and naproxen · Some medications that are also used for depression or seizures · Physical therapy and exercise · Counseling to help patients learn how to cope better with triggers of pain and stress. · Application of heat or cold compress · Massage therapy · Relaxation techniques Be Informed Make sure you know the name of your medication, how much and how often to take it, and its potential risks & side effects. IF YOU ARE PRESCRIBED OPIOIDS FOR PAIN: 
· Never take opioids in greater amounts or more often than prescribed. Remember the goal is not to be pain-free but to manage your pain at a tolerable level. · Follow up with your primary care provider to: · Work together to create a plan on how to manage your pain. · Talk about ways to help manage your pain that don't involve prescription opioids. · Talk about any and all concerns and side effects. · Help prevent misuse and abuse. · Never sell or share prescription opioids · Help prevent misuse and abuse. · Store prescription opioids in a secure place and out of reach of others (this may include visitors, children, friends, and family). · Safely dispose of unused/unwanted prescription opioids: Find your community drug take-back program or your pharmacy mail-back program, or flush them down the toilet, following guidance from the Food and Drug Administration (www.fda.gov/Drugs/ResourcesForYou). · Visit www.cdc.gov/drugoverdose to learn about the risks of opioid abuse and overdose. · If you believe you may be struggling with addiction, tell your health care provider and ask for guidance or call [employer] at 9-185-918-WHHU. Discharge Instructions Patient Discharge Instructions Kin Carranza / [id number] : 1945 Admitted 2018 Discharged: 2018 Take Home Medications Restart aspirin in about 4-5 days · It is important that you take the medication exactly as they are prescribed. · Keep your medication in the bottles provided by the pharmacist and keep a list of the medication names, dosages, and times to be taken in your wallet. · Do not take other medications without consulting your doctor. What to do at [facility] Recommended activity: No Lifting for 6 weeks. Follow-up with [facility]. Call for an appointment (if not already scheduled)            [phone]. Information obtained by : 
I understand that if any problems occur once I am at home I am to contact my physician. I understand and acknowledge receipt of the instructions indicated above. Physician's or R.N.'s Signature                                                                  Date/Time Patient or Representative Signature                                                          Date/Time Introducing [facility]! [employer] introduces [employer] patient portal. Now you can access parts of your medical record, email your doctor's office, and request medication refills online. 1. In your internet browser, go to [email or web address] 2. Click on the First Time User? Click Here link in the Sign In box. You will see the New Member Sign Up page. 3. Enter your [employer] Access Code exactly as it appears below.  You will not need to use this code after youve completed the sign-up process. If you do not sign up before the expiration date, you must request a new code. · [email or web address] Access Code: [email or web address] Expires: 10/30/2018 10:15 AM 
 
4. Enter the last four digits of your Social Security Number (xxxx) and Date of Birth (mm/dd/yyyy) as indicated and click Submit. You will be taken to the next sign-up page. 5. Create a [email or web address] ID. This will be your [email or web address] login ID and cannot be changed, so think of one that is secure and easy to remember. 6. Create a [email or web address] password. You can change your password at any time. 7. Enter your Password Reset Question and Answer. This can be used at a later time if you forget your password. 8. Enter your e-mail address. You will receive e-mail notification when new information is available in [address] 9. Click Sign Up. You can now view and download portions of your medical record. 10. Click the Download Summary menu link to download a portable copy of your medical information. If you have questions, please visit the Frequently Asked Questions section of the [email or web address] website. Remember, [email or web address] is NOT to be used for urgent needs. For medical emergencies, dial 911. Now available from your iPhone and Android! Introducing Zan Phillips As a [facility] patient, I wanted to make you aware of our electronic visit tool called Zan Phillips. [facility] 24/7 allows you to connect within minutes with a medical provider 24 hours a day, seven days a week via a mobile device or tablet or logging into a secure website from your computer. You can access Zan [employer]ram[employer]at[employer] from anywhere in the [place].  
 
A virtual visit might be right for you when you have a simple condition and feel like you just dont want to get out of bed, or cant get away from work for an appointment, when your regular [facility] provider is not available (evenings, weekends or holidays), or when youre out of town and need minor care. Electronic visits cost only $49 and if the [facility] 24[facility] provider determines a prescription is needed to treat your condition, one can be electronically transmitted to a nearby pharmacy*. Please take a moment to enroll today if you have not already done so. The enrollment process is free and takes just a few minutes. To enroll, please download the [employer] keyla to your tablet or phone, or visit [email or web address] to enroll on your computer. And, as an [address] [place] [address] patient with a [employer] account, the results of your visits will be scanned into your electronic medical record and your primary care provider will be able to view the scanned results. We urge you to continue to see your regular [facility] provider for your ongoing medical care. And while your primary care provider may not be the one available when you seek a [employer] virtual visit, the peace of mind you get from getting a real diagnosis real time can be priceless. For more information on [employer], view our Frequently Asked Questions (FAQs) at [email or web address]. Sincerely, 
 
Arie Amezquita MD 
Chief Medical Officer [address] *:  certain medications cannot be prescribed via [employer] Providers Seen During Your Hospitalization Provider Specialty Primary office phone Janneth Oh MD Urology [phone] Your Primary Care Physician (PCP) Primary Care Physician Office Phone Office Fax Chris ySkes [phone] You are allergic to the following No active allergies Recent Documentation Height Weight BMI Smoking Status 1.727 m 80.6 kg 27 kg/m2 Former Smoker Emergency Contacts Name Discharge Info Relation Home Work Mobile [facility] DISCHARGE CAREGIVER [3] Spouse [3] [phone] David Milligan Jr DISCHARGE CAREGIVER [3] Son [22] [phone] Patient Belongings The following personal items are in your possession at time of discharge: 
  Dental Appliances: None  Visual Aid: None             Clothing: Other (comment) (bag of clotes returned to patient) Please provide this summary of care documentation to your next provider. Signatures-by signing, you are acknowledging that this After Visit Summary has been reviewed with you and you have received a copy. Patient Signature:  ____________________________________________________________ Date:  ____________________________________________________________  
  
[facility] Provider Signature:  ____________________________________________________________ Date:  ____________________________________________________________

## 2018-08-28 LAB
ANION GAP SERPL CALC-SCNC: 11 MMOL/L (ref 5–15)
BUN SERPL-MCNC: 23 MG/DL (ref 6–20)
BUN/CREAT SERPL: 19 (ref 12–20)
CALCIUM SERPL-MCNC: 8.2 MG/DL (ref 8.5–10.1)
CHLORIDE SERPL-SCNC: 99 MMOL/L (ref 97–108)
CO2 SERPL-SCNC: 23 MMOL/L (ref 21–32)
CREAT SERPL-MCNC: 1.24 MG/DL (ref 0.7–1.3)
GLUCOSE SERPL-MCNC: 178 MG/DL (ref 65–100)
HGB BLD-MCNC: 9.1 G/DL (ref 12.1–17)
MAGNESIUM SERPL-MCNC: 2 MG/DL (ref 1.6–2.4)
PHOSPHATE SERPL-MCNC: 4.3 MG/DL (ref 2.6–4.7)
POTASSIUM SERPL-SCNC: 4.8 MMOL/L (ref 3.5–5.1)
SODIUM SERPL-SCNC: 133 MMOL/L (ref 136–145)

## 2018-08-28 PROCEDURE — C9113 INJ PANTOPRAZOLE SODIUM, VIA: HCPCS | Performed by: UROLOGY

## 2018-08-28 PROCEDURE — 83735 ASSAY OF MAGNESIUM: CPT | Performed by: UROLOGY

## 2018-08-28 PROCEDURE — 65270000029 HC RM PRIVATE

## 2018-08-28 PROCEDURE — 74011250636 HC RX REV CODE- 250/636: Performed by: UROLOGY

## 2018-08-28 PROCEDURE — 85018 HEMOGLOBIN: CPT | Performed by: UROLOGY

## 2018-08-28 PROCEDURE — 74011000250 HC RX REV CODE- 250: Performed by: UROLOGY

## 2018-08-28 PROCEDURE — 36415 COLL VENOUS BLD VENIPUNCTURE: CPT | Performed by: UROLOGY

## 2018-08-28 PROCEDURE — 74011000250 HC RX REV CODE- 250

## 2018-08-28 PROCEDURE — 74011250637 HC RX REV CODE- 250/637: Performed by: UROLOGY

## 2018-08-28 PROCEDURE — 84100 ASSAY OF PHOSPHORUS: CPT | Performed by: UROLOGY

## 2018-08-28 PROCEDURE — 80048 BASIC METABOLIC PNL TOTAL CA: CPT | Performed by: UROLOGY

## 2018-08-28 PROCEDURE — 94760 N-INVAS EAR/PLS OXIMETRY 1: CPT

## 2018-08-28 RX ORDER — BACITRACIN 500 UNIT/G
PACKET (EA) TOPICAL
Status: COMPLETED
Start: 2018-08-28 | End: 2018-08-28

## 2018-08-28 RX ORDER — ACETAMINOPHEN 10 MG/ML
1000 INJECTION, SOLUTION INTRAVENOUS EVERY 6 HOURS
Status: COMPLETED | OUTPATIENT
Start: 2018-08-28 | End: 2018-08-29

## 2018-08-28 RX ADMIN — SODIUM CHLORIDE 40 MG: 9 INJECTION, SOLUTION INTRAMUSCULAR; INTRAVENOUS; SUBCUTANEOUS at 09:44

## 2018-08-28 RX ADMIN — Medication 10 ML: at 22:03

## 2018-08-28 RX ADMIN — ACETAMINOPHEN 1000 MG: 10 INJECTION, SOLUTION INTRAVENOUS at 19:06

## 2018-08-28 RX ADMIN — SODIUM CHLORIDE 40 MG: 9 INJECTION, SOLUTION INTRAMUSCULAR; INTRAVENOUS; SUBCUTANEOUS at 22:02

## 2018-08-28 RX ADMIN — ACETAMINOPHEN 1000 MG: 10 INJECTION, SOLUTION INTRAVENOUS at 03:43

## 2018-08-28 RX ADMIN — BACITRACIN: 500 OINTMENT TOPICAL at 13:00

## 2018-08-28 RX ADMIN — ALVIMOPAN 12 MG: 12 CAPSULE ORAL at 18:09

## 2018-08-28 RX ADMIN — ALVIMOPAN 12 MG: 12 CAPSULE ORAL at 09:44

## 2018-08-28 RX ADMIN — KETOROLAC TROMETHAMINE 15 MG: 30 INJECTION, SOLUTION INTRAMUSCULAR; INTRAVENOUS at 06:27

## 2018-08-28 RX ADMIN — ACETAMINOPHEN 1000 MG: 10 INJECTION, SOLUTION INTRAVENOUS at 09:44

## 2018-08-28 RX ADMIN — SODIUM CHLORIDE, SODIUM LACTATE, POTASSIUM CHLORIDE, AND CALCIUM CHLORIDE 75 ML/HR: 600; 310; 30; 20 INJECTION, SOLUTION INTRAVENOUS at 06:34

## 2018-08-28 RX ADMIN — SODIUM CHLORIDE, SODIUM LACTATE, POTASSIUM CHLORIDE, AND CALCIUM CHLORIDE 75 ML/HR: 600; 310; 30; 20 INJECTION, SOLUTION INTRAVENOUS at 20:16

## 2018-08-28 RX ADMIN — Medication 10 ML: at 06:27

## 2018-08-28 RX ADMIN — CELECOXIB 100 MG: 100 CAPSULE ORAL at 18:09

## 2018-08-28 NOTE — WOUND CARE
CWOCN Ostomy Progress Note:  
 
First post -op visit: 8-28-18 Type of Ostomy: ileal conduit Location: RLQ Date of Surgery: 8-27-18    Surgeon: Dr. Lenny Little Treatments: 
Plan tomorrow 8-29-18 : Pouch removal, stoma and peristomal skin cleaning and assessment, new pouch to be prepared and applied. Patient , wife and son planning for observation tomorrow at 0. Findings: 
Current appliance: 2 piece Loan. Stoma size:  
Stoma color:  
Characteristics:  
Peristomal skin: Abdomen: round and soft. Output: palomares bag / positive flatus/ not yet. Pt Concerns: further teaching at home? Explained role of Home Health and home visits. Teaching/Subjects covered today: 
Encouraged pt to review handout given to patient/family and ask questions regarding material on next ostomy nurse visit. 8-28-18- General anatomy and expectations of output Normal & abnormal stoma characteristics & changes over time - Normal & abnormal peristomal characteristics - When/how to clean stoma & peristomal skin When/how to empty pouch 
- Crusting technique - Shaving around stoma 8-28-18- When/how to change appliance - When to notify nurse/physician 
8-28-18 Dietary guidelines - Where/how to obtain supplies 8-28-18- Manipulated/practiced with emptying valve 8-28-18- Reviewed ADL's (bathing, mattress cover, car pillow to protect from seatbelt, etc) 8-28-18- UTI / S&S 
- Pharmacy notification re: meds 8-28-18- S&S of urinary tract infection & encouraged fluid intake 8-28-18- Night-time drainage to bedside bag Pt demonstrated understanding of above material covered by / Lisa Galvan BSN RN : 4[birth year]91[birth year]05 Patient , son and wife  present during visit. Recommendations: 1. Empty appliance when 1/3 full and PRN.  Encourage patient/family to notify nurse and 
 assist w/ pouch emptying to promote self-care. Change appliance twice weekly and PRN for leaking ASAP. DO NOT REINFORCE LEAKS to avoid skin breakdown. Transition of Care: 
Ostomy nurse to return and reinforce teaching: tomorrow / Wednesday / 8-29-18. Patient has learned vital skills  but will likely need home health care for further teaching after discharge. Home Health consult placed. Discharge plan is to go home. Plan to continue teaching tomorrow with a pouch change/ 8-29-18. Discussed care/progress with family. Supplies given to patient with ordering information / plan: Thursday: 8-30-18 Charleen MERRITT RN Wound Care Department Office: [phone] Pager: [phone]

## 2018-08-28 NOTE — PROGRESS NOTES
Bedside and Verbal shift change report given to Shirley Morgan RN (oncoming nurse) by Audie Noe RN (offgoing nurse). Report included the following information SBAR, Kardex, ED Summary, OR Summary, Intake/Output, MAR and Recent Results.

## 2018-08-28 NOTE — PROGRESS NOTES
Patient's SONDRA drain continued to have bleeding at insertion site this shift. Dressing saturated x3 in 12 hours. MD aware of bleeding. Orders in place to monitor vitals and recheck HGB in a.m. Also received telephone orders to hold Lovenox this p.m. due to bleeding concerns. Will continue to monitor and communicate to night shift as well.

## 2018-08-28 NOTE — PROGRESS NOTES
Around 12:10pm, patient's family member came to desk saying patient's IV was leaking. RN responded immediately and found Right IJ partially removed and hanging loosely from neck. Patient was in chair at the time, and was helped to return to bed, while stabilizing [facility]. After consulting with charge nurse, RN removed central line per protocol and applied sterile compressive dressing. Patient instructed to continue lying flat [id number], per hospital policy. LEAH Swanson, was also present, and agreed to notify Dr. Clare Lindo of removal of central line. Patient's IV fluids were restarted via peripheral line. Will continue to monitor.

## 2018-08-28 NOTE — WOUND CARE
WOCN Note:     Writer when into room  and introduced self and services. Patient awake with son and wife at bedside. Wife going home to sleep a few hours and shower. Writer planning to return at 56 and begin teaching with patient, wife and possibly son. Written material left at bedside to be included in teaching. To follow transition of care.      Seferino MERRITT RN  Wound Care Department   Office: [phone]  Paer: [phone]

## 2018-08-28 NOTE — PROGRESS NOTES
8/28/18 1410: S/w Ritika Moon at AT [facility], Ritika Moon confirms that agency can staff case. Agency info added to AVS. DISCHARGING CM PLEASE NOTIFY AT HOME [facility] VIA [employer] OR CALL [phone] WHEN DISCHARGE DATE CONFIRMED. 
 
-------------------------- 
8/28/18 1351 Reason for Admission: scheduled surgery (radical cystoprostatectomy, urethrectomy, extended bilateral pelvic lymphadenectomy, ileal conduit, urinary diversion) RRAT Score: 10 Plan for utilizing home health: [facility] for ostomy care/teaching Likelihood of Readmission: low Transition of Care Plan:  73 y/o   male insured by [facility] A/B (Masina 49 secondary), [facility] bladder CA, admitted to [facility] for scheduled urology surgery. CM s/w patient at bedside, pt's son Malik Sutton also present. Patient A&Ox4, independent with ADL/IADL's, confirmed demographics on facesheet. Patient to require [facility] for ostomy care, patient denies agency preference, states that last [facility] (following knee surgery) was through [facility], [facility]. Patient lives in [place], area is not serviced by [facility]. [employer] referral sent to [facility] [phone]. CM also called agency, however no answer. Also sent referral to AT [address] [place] [address]. Will continue to follow.

## 2018-08-29 LAB
ANION GAP SERPL CALC-SCNC: 12 MMOL/L (ref 5–15)
BUN SERPL-MCNC: 25 MG/DL (ref 6–20)
BUN/CREAT SERPL: 24 (ref 12–20)
CALCIUM SERPL-MCNC: 8 MG/DL (ref 8.5–10.1)
CHLORIDE SERPL-SCNC: 99 MMOL/L (ref 97–108)
CO2 SERPL-SCNC: 22 MMOL/L (ref 21–32)
CREAT SERPL-MCNC: 1.05 MG/DL (ref 0.7–1.3)
GLUCOSE SERPL-MCNC: 120 MG/DL (ref 65–100)
HGB BLD-MCNC: 7.2 G/DL (ref 12.1–17)
HGB BLD-MCNC: 7.3 G/DL (ref 12.1–17)
MAGNESIUM SERPL-MCNC: 2.2 MG/DL (ref 1.6–2.4)
PHOSPHATE SERPL-MCNC: 3.2 MG/DL (ref 2.6–4.7)
POTASSIUM SERPL-SCNC: 4.2 MMOL/L (ref 3.5–5.1)
SODIUM SERPL-SCNC: 133 MMOL/L (ref 136–145)

## 2018-08-29 PROCEDURE — 65270000029 HC RM PRIVATE

## 2018-08-29 PROCEDURE — 74011250637 HC RX REV CODE- 250/637: Performed by: UROLOGY

## 2018-08-29 PROCEDURE — 85018 HEMOGLOBIN: CPT | Performed by: UROLOGY

## 2018-08-29 PROCEDURE — 94760 N-INVAS EAR/PLS OXIMETRY 1: CPT

## 2018-08-29 PROCEDURE — 77030011641 HC PASTE OST ADH BMS -A

## 2018-08-29 PROCEDURE — 80048 BASIC METABOLIC PNL TOTAL CA: CPT | Performed by: UROLOGY

## 2018-08-29 PROCEDURE — 83735 ASSAY OF MAGNESIUM: CPT | Performed by: UROLOGY

## 2018-08-29 PROCEDURE — 74011000250 HC RX REV CODE- 250: Performed by: UROLOGY

## 2018-08-29 PROCEDURE — 74011250636 HC RX REV CODE- 250/636: Performed by: UROLOGY

## 2018-08-29 PROCEDURE — 77030008895 HC ADPT UROSTMY TU HOLL -A

## 2018-08-29 PROCEDURE — 84100 ASSAY OF PHOSPHORUS: CPT | Performed by: UROLOGY

## 2018-08-29 PROCEDURE — 77030010522

## 2018-08-29 PROCEDURE — 77010033678 HC OXYGEN DAILY

## 2018-08-29 PROCEDURE — 77030010545

## 2018-08-29 PROCEDURE — 77030010541

## 2018-08-29 PROCEDURE — C9113 INJ PANTOPRAZOLE SODIUM, VIA: HCPCS | Performed by: UROLOGY

## 2018-08-29 PROCEDURE — 36415 COLL VENOUS BLD VENIPUNCTURE: CPT | Performed by: UROLOGY

## 2018-08-29 RX ADMIN — CELECOXIB 100 MG: 100 CAPSULE ORAL at 08:36

## 2018-08-29 RX ADMIN — SODIUM CHLORIDE, SODIUM LACTATE, POTASSIUM CHLORIDE, AND CALCIUM CHLORIDE 75 ML/HR: 600; 310; 30; 20 INJECTION, SOLUTION INTRAVENOUS at 21:49

## 2018-08-29 RX ADMIN — SODIUM CHLORIDE 40 MG: 9 INJECTION, SOLUTION INTRAMUSCULAR; INTRAVENOUS; SUBCUTANEOUS at 08:36

## 2018-08-29 RX ADMIN — SODIUM CHLORIDE 40 MG: 9 INJECTION, SOLUTION INTRAMUSCULAR; INTRAVENOUS; SUBCUTANEOUS at 21:19

## 2018-08-29 RX ADMIN — Medication 10 ML: at 21:20

## 2018-08-29 RX ADMIN — CELECOXIB 100 MG: 100 CAPSULE ORAL at 18:54

## 2018-08-29 RX ADMIN — ACETAMINOPHEN 1000 MG: 10 INJECTION, SOLUTION INTRAVENOUS at 07:36

## 2018-08-29 RX ADMIN — TRAMADOL HYDROCHLORIDE 50 MG: 50 TABLET, FILM COATED ORAL at 02:08

## 2018-08-29 RX ADMIN — SODIUM CHLORIDE, SODIUM LACTATE, POTASSIUM CHLORIDE, AND CALCIUM CHLORIDE 75 ML/HR: 600; 310; 30; 20 INJECTION, SOLUTION INTRAVENOUS at 09:45

## 2018-08-29 RX ADMIN — ACETAMINOPHEN 1000 MG: 10 INJECTION, SOLUTION INTRAVENOUS at 00:55

## 2018-08-29 RX ADMIN — ENOXAPARIN SODIUM 40 MG: 100 INJECTION SUBCUTANEOUS at 18:54

## 2018-08-29 RX ADMIN — ALVIMOPAN 12 MG: 12 CAPSULE ORAL at 08:36

## 2018-08-29 RX ADMIN — Medication 10 ML: at 13:20

## 2018-08-29 RX ADMIN — ALVIMOPAN 12 MG: 12 CAPSULE ORAL at 18:54

## 2018-08-29 RX ADMIN — ACETAMINOPHEN 1000 MG: 10 INJECTION, SOLUTION INTRAVENOUS at 13:06

## 2018-08-29 NOTE — PROGRESS NOTES
Bedside shift change report given to Isabel RN (oncoming nurse) by Sima Oropeza RN (offgoing nurse). Report included the following information SBAR, Intake/Output and MAR.

## 2018-08-29 NOTE — PROGRESS NOTES
Spiritual Care Partner Volunteer visited patient in [address] [place] [address] on 8/29/18. Documented by: 
Esperanza Samuel M.Div.  Paging Service [phone])

## 2018-08-29 NOTE — WOUND CARE
WOCN Note: Follow-up visit for urostomy: Karyle Burger #2 Wife and son present for pouch change. Removed OR 2 piece [place] pouching system with ease. Stoma is red,moist and nicely budded. Measures 1\". Cleaned stoma and peristomal skin. All clear,little mucous removed from stents. Applied 2 piece [place] pouching system. Both white stents patent and dripping urine. Family and patient observing and asking good questions. Prior to pouch change ambulated patient to bathroom and patient emptied pouch in toilet. Managed well with IV pump, walker and drainage bag. Assisted back to bed and did above. Reviewed with family: normal and abnormal stoma characteristics, when and how to clean stoma, onelia stomal characteristics, crusting, shaving around stoma, Tomorrow patient needs to go over ordering #'s and supplies. All supplies in room to go home with. Patient would like to change pouch ome more time prior to going home . To follow transition of care. Conor MERRITT RN Wound Care Department Office: [phone] Pager: [phone]

## 2018-08-29 NOTE — WOUND CARE
WOCN Note:  
 
Writer went into room to see patient. Patient up in chair, son and patient's wife at bedside. Plan was to continue teaching today at 1300. Wife going home to sleep and teaching will be bumped to 1430. Writer will disconnect pouch from palomares bag at 1300. Plan is for patient to walk to bathroom and empty pouch in toilet. Pouch change will be done today too with patient, wife and son present. Spoke with  RN/ Cary Turner about plan for today. To follow transition of care. Dennis MERRITT RN Wound Care Department Office: [phone] Pager: [phone]

## 2018-08-30 LAB
ANION GAP SERPL CALC-SCNC: 10 MMOL/L (ref 5–15)
BUN SERPL-MCNC: 15 MG/DL (ref 6–20)
BUN/CREAT SERPL: 16 (ref 12–20)
CALCIUM SERPL-MCNC: 8 MG/DL (ref 8.5–10.1)
CHLORIDE SERPL-SCNC: 100 MMOL/L (ref 97–108)
CO2 SERPL-SCNC: 25 MMOL/L (ref 21–32)
CREAT FLD-MCNC: 0.89 MG/DL
CREAT SERPL-MCNC: 0.93 MG/DL (ref 0.7–1.3)
GLUCOSE SERPL-MCNC: 106 MG/DL (ref 65–100)
HGB BLD-MCNC: 7.5 G/DL (ref 12.1–17)
MAGNESIUM SERPL-MCNC: 2.1 MG/DL (ref 1.6–2.4)
PHOSPHATE SERPL-MCNC: 3.1 MG/DL (ref 2.6–4.7)
POTASSIUM SERPL-SCNC: 4.2 MMOL/L (ref 3.5–5.1)
SODIUM SERPL-SCNC: 135 MMOL/L (ref 136–145)
SPECIMEN SOURCE FLD: NORMAL

## 2018-08-30 PROCEDURE — 74011000250 HC RX REV CODE- 250: Performed by: UROLOGY

## 2018-08-30 PROCEDURE — 80048 BASIC METABOLIC PNL TOTAL CA: CPT | Performed by: UROLOGY

## 2018-08-30 PROCEDURE — C9113 INJ PANTOPRAZOLE SODIUM, VIA: HCPCS | Performed by: UROLOGY

## 2018-08-30 PROCEDURE — 36415 COLL VENOUS BLD VENIPUNCTURE: CPT | Performed by: UROLOGY

## 2018-08-30 PROCEDURE — 74011250636 HC RX REV CODE- 250/636: Performed by: UROLOGY

## 2018-08-30 PROCEDURE — 65270000029 HC RM PRIVATE

## 2018-08-30 PROCEDURE — 83735 ASSAY OF MAGNESIUM: CPT | Performed by: UROLOGY

## 2018-08-30 PROCEDURE — 85018 HEMOGLOBIN: CPT | Performed by: UROLOGY

## 2018-08-30 PROCEDURE — 94760 N-INVAS EAR/PLS OXIMETRY 1: CPT

## 2018-08-30 PROCEDURE — 84100 ASSAY OF PHOSPHORUS: CPT | Performed by: UROLOGY

## 2018-08-30 PROCEDURE — 74011250637 HC RX REV CODE- 250/637: Performed by: UROLOGY

## 2018-08-30 PROCEDURE — 82570 ASSAY OF URINE CREATININE: CPT | Performed by: UROLOGY

## 2018-08-30 RX ADMIN — CELECOXIB 100 MG: 100 CAPSULE ORAL at 19:28

## 2018-08-30 RX ADMIN — Medication 10 ML: at 22:20

## 2018-08-30 RX ADMIN — SODIUM CHLORIDE, SODIUM LACTATE, POTASSIUM CHLORIDE, AND CALCIUM CHLORIDE 75 ML/HR: 600; 310; 30; 20 INJECTION, SOLUTION INTRAVENOUS at 10:45

## 2018-08-30 RX ADMIN — SODIUM CHLORIDE 40 MG: 9 INJECTION, SOLUTION INTRAMUSCULAR; INTRAVENOUS; SUBCUTANEOUS at 22:15

## 2018-08-30 RX ADMIN — ONDANSETRON 4 MG: 2 INJECTION INTRAMUSCULAR; INTRAVENOUS at 09:12

## 2018-08-30 RX ADMIN — ALVIMOPAN 12 MG: 12 CAPSULE ORAL at 19:28

## 2018-08-30 RX ADMIN — CELECOXIB 100 MG: 100 CAPSULE ORAL at 10:45

## 2018-08-30 RX ADMIN — SODIUM CHLORIDE, SODIUM LACTATE, POTASSIUM CHLORIDE, AND CALCIUM CHLORIDE 75 ML/HR: 600; 310; 30; 20 INJECTION, SOLUTION INTRAVENOUS at 10:46

## 2018-08-30 RX ADMIN — ALVIMOPAN 12 MG: 12 CAPSULE ORAL at 10:45

## 2018-08-30 RX ADMIN — ENOXAPARIN SODIUM 40 MG: 100 INJECTION SUBCUTANEOUS at 19:28

## 2018-08-30 RX ADMIN — SODIUM CHLORIDE 40 MG: 9 INJECTION, SOLUTION INTRAMUSCULAR; INTRAVENOUS; SUBCUTANEOUS at 10:45

## 2018-08-30 NOTE — PROGRESS NOTES
Bedside shift change report given to [place] and [place] (oncoming nurse) by Esau Gitelman (offgoing nurse). Report included the following information SBAR, Kardex and MAR.

## 2018-08-30 NOTE — PROGRESS NOTES
vss af abd soft. Having flatus. hgb stable. Plan dc palomares from perineum and change dressing. walk

## 2018-08-30 NOTE — WOUND CARE
Wound Consult: Follow Up Visit. Chart reviewed. Consulted for urostomy care/teaching. Spoke with patients nurse,  Calos Douglas after seeing patient as he was having nausea; she was in to medicate with Zofran. Patient is resting on a [employer] bed. Patient's wife and son at bedside. We planned on changing appliance together tomorrow at 0830. Renetta Bose RN,[facility] Office [phone] Pager # [phone]

## 2018-08-30 NOTE — PROGRESS NOTES
Bedside shift change report given to [facility], RN (oncoming nurse) by Gorge RN (offgoing nurse). Report included the following information SBAR, Intake/Output, MAR and Recent Results.

## 2018-08-31 VITALS
WEIGHT: 177.6 LBS | TEMPERATURE: 98.8 F | DIASTOLIC BLOOD PRESSURE: 81 MMHG | HEIGHT: 68 IN | SYSTOLIC BLOOD PRESSURE: 139 MMHG | RESPIRATION RATE: 18 BRPM | BODY MASS INDEX: 26.92 KG/M2 | OXYGEN SATURATION: 96 % | HEART RATE: 93 BPM

## 2018-08-31 LAB
ANION GAP SERPL CALC-SCNC: 8 MMOL/L (ref 5–15)
BUN SERPL-MCNC: 13 MG/DL (ref 6–20)
BUN/CREAT SERPL: 13 (ref 12–20)
CALCIUM SERPL-MCNC: 7.7 MG/DL (ref 8.5–10.1)
CHLORIDE SERPL-SCNC: 99 MMOL/L (ref 97–108)
CO2 SERPL-SCNC: 27 MMOL/L (ref 21–32)
CREAT SERPL-MCNC: 0.97 MG/DL (ref 0.7–1.3)
GLUCOSE SERPL-MCNC: 118 MG/DL (ref 65–100)
HGB BLD-MCNC: 7.6 G/DL (ref 12.1–17)
POTASSIUM SERPL-SCNC: 4 MMOL/L (ref 3.5–5.1)
SODIUM SERPL-SCNC: 134 MMOL/L (ref 136–145)

## 2018-08-31 PROCEDURE — C9113 INJ PANTOPRAZOLE SODIUM, VIA: HCPCS | Performed by: UROLOGY

## 2018-08-31 PROCEDURE — 74011250637 HC RX REV CODE- 250/637: Performed by: UROLOGY

## 2018-08-31 PROCEDURE — 36415 COLL VENOUS BLD VENIPUNCTURE: CPT | Performed by: UROLOGY

## 2018-08-31 PROCEDURE — 74011000250 HC RX REV CODE- 250: Performed by: UROLOGY

## 2018-08-31 PROCEDURE — 77010033678 HC OXYGEN DAILY

## 2018-08-31 PROCEDURE — 80048 BASIC METABOLIC PNL TOTAL CA: CPT | Performed by: UROLOGY

## 2018-08-31 PROCEDURE — 94760 N-INVAS EAR/PLS OXIMETRY 1: CPT

## 2018-08-31 PROCEDURE — 74011250636 HC RX REV CODE- 250/636: Performed by: UROLOGY

## 2018-08-31 PROCEDURE — 85018 HEMOGLOBIN: CPT | Performed by: UROLOGY

## 2018-08-31 RX ORDER — TRAMADOL HYDROCHLORIDE 50 MG/1
50 TABLET ORAL
Qty: 20 TAB | Refills: 0 | Status: ON HOLD | OUTPATIENT
Start: 2018-08-31 | End: 2018-09-08

## 2018-08-31 RX ORDER — CEPHALEXIN 500 MG/1
500 CAPSULE ORAL 4 TIMES DAILY
Qty: 20 CAP | Refills: 0 | Status: SHIPPED | OUTPATIENT
Start: 2018-08-31 | End: 2018-09-05

## 2018-08-31 RX ADMIN — SODIUM CHLORIDE 40 MG: 9 INJECTION, SOLUTION INTRAMUSCULAR; INTRAVENOUS; SUBCUTANEOUS at 10:06

## 2018-08-31 RX ADMIN — TRAMADOL HYDROCHLORIDE 50 MG: 50 TABLET, FILM COATED ORAL at 04:50

## 2018-08-31 RX ADMIN — ALVIMOPAN 12 MG: 12 CAPSULE ORAL at 10:06

## 2018-08-31 RX ADMIN — CELECOXIB 100 MG: 100 CAPSULE ORAL at 10:06

## 2018-08-31 NOTE — DISCHARGE INSTRUCTIONS
Patient Discharge Instructions    Yadi Beasley / [id number] : 1945    Admitted 2018 Discharged: 2018     Take Home Medications   Restart aspirin in about 4-5 days    · It is important that you take the medication exactly as they are prescribed. · Keep your medication in the bottles provided by the pharmacist and keep a list of the medication names, dosages, and times to be taken in your wallet. · Do not take other medications without consulting your doctor. What to do at Home      Recommended activity: No Lifting for 6 weeks. Follow-up with [facility]. Call for an appointment (if not already scheduled)            [phone]. Information obtained by :  I understand that if any problems occur once I am at home I am to contact my physician. I understand and acknowledge receipt of the instructions indicated above.                                                                                                                                            Physician's or R.N.'s Signature                                                                  Date/Time                                                                                                                                              Patient or Representative Signature                                                          Date/Time

## 2018-08-31 NOTE — WOUND CARE
Wound Consult: Follow Up Visit for continued ostomy teaching. Patient's wife and son at bedside. He feels well today. Assessment: 
RLQ urostomy - moist red budded stoma with stents x 2 and sutures noted. Allison-stomal skin intact. Teaching: 
Patient's wife measured, cut and placed the appliance; we added an eakins ring as leakage was noted under the appliance that we removed - sitting patient upright in bed shows some folding at ostomy on sides. She snapped on the pouch; and we discussed using one piece versus two piece - one piece may work better with fold - she has both for discharge; item numbers for discharge and information on ordering. Mrs. Merry Lambert did a great job and patient is involved repeating back steps as we proceed; his son also reinforced teaching. Plan: 
Discharge home today; supplies ample; home health to follow. Suma Lopez RN,CN Wound Healing Office [phone] Pager [phone]

## 2018-08-31 NOTE — PROGRESS NOTES
Bedside shift change report given to Rome Barajas (oncoming nurse) by Lars Kawasaki (offgoing nurse). Report included the following information SBAR.

## 2018-08-31 NOTE — PROGRESS NOTES
Reviewed medical chart; note that the patient will discharge home today. Called and spoke with At [place] [address], #[phone]; Ronak Oviedo confirmed a start of care date for tomorrow (9/1/18). Patient's son will drive him home and plans to fill his prescriptions at the [address] [facility] Follow Up Appointment:  
Mel Cano  On 9/7/2018  [facility] Follow Up Appointment: 9/7/18 at [address] [facility] [address] [place] [address] 
[phone] Care Management will continue to follow his disposition.   
GURMEET Manuel

## 2018-08-31 NOTE — PROGRESS NOTES
Bedside shift change report given to Gonzalo Stringer and Mitchel Barnes (oncoming nurse) by Elsa Paredes (offgoing nurse). Report included the following information SBAR, Kardex and MAR.

## 2018-09-02 ENCOUNTER — HOSPITAL ENCOUNTER (INPATIENT)
Age: 73
LOS: 5 days | Discharge: HOME HEALTH | DRG: 389 | End: 2018-09-08
Attending: EMERGENCY MEDICINE | Admitting: FAMILY MEDICINE
Payer: MEDICARE

## 2018-09-02 DIAGNOSIS — R10.84 ABDOMINAL PAIN, GENERALIZED: Primary | ICD-10-CM

## 2018-09-02 DIAGNOSIS — L76.82 PAIN AT SURGICAL INCISION: ICD-10-CM

## 2018-09-02 DIAGNOSIS — K56.7 ILEUS (HCC): ICD-10-CM

## 2018-09-02 PROCEDURE — 99285 EMERGENCY DEPT VISIT HI MDM: CPT

## 2018-09-02 NOTE — IP AVS SNAPSHOT
[address] 
[phone] Patient: Omid Ahumada MRN: [id number] ZCK:[birth year] A check nel indicates which time of day the medication should be taken. My Medications CONTINUE taking these medications Instructions Each Dose to Equal  
 Morning Noon Evening Bedtime  
 aspirin delayed-release 81 mg tablet Your last dose was: Your next dose is: Take 81 mg by mouth daily. 81 mg  
    
   
   
   
  
 calcium carbonate 500 mg calcium (1,250 mg) tablet Commonly known as:  OS-BLANE Your last dose was: Your next dose is: Take 2 Tabs by mouth daily. 2 Tab Cholecalciferol (Vitamin D3) 2,000 unit Cap capsule Commonly known as:  VITAMIN D3 Your last dose was: Your next dose is: Take 4,000 Units by mouth daily. 4000 Units LIPITOR 20 mg tablet Generic drug:  atorvastatin Your last dose was: Your next dose is: Take 20 mg by mouth nightly. 20 mg  
    
   
   
   
  
 lisinopril 10 mg tablet Commonly known as:  Daily Gupta Your last dose was: Your next dose is: Take 10 mg by mouth nightly. 10 mg  
    
   
   
   
  
 traMADol 50 mg tablet Commonly known as:  ULTRAM  
   
Your last dose was: Your next dose is: Take 1 Tab by mouth every six (6) hours as needed for Pain. Max Daily Amount: 200 mg.  
 50 mg  
    
   
   
   
  
 TYLENOL EXTRA STRENGTH 500 mg tablet Generic drug:  acetaminophen Your last dose was: Your next dose is: Take 1,000 mg by mouth every six (6) hours as needed for Pain. 1000 mg VITALINE COQ10 PO Your last dose was: Your next dose is: Take 200 mg by mouth daily.   
 200 mg  
    
   
   
   
  
 VITAMIN C 500 mg tablet Generic drug:  ascorbic acid (vitamin C) Your last dose was: Your next dose is: Take 500 mg by mouth daily. 500 mg  
    
   
   
   
  
  
STOP taking these medications SUPER B COMPLEX PO  
   
  
  
ASK your doctor about these medications Instructions Each Dose to Equal  
 Morning Noon Evening Bedtime  
 cephALEXin 500 mg capsule Commonly known as:  Joyd Mayorga Ask about: Should I take this medication? Your last dose was: Your next dose is: Take 1 Cap by mouth four (4) times daily for 5 days. 500 mg Where to Get Your Medications Information on where to get these meds will be given to you by the nurse or doctor. ! Ask your nurse or doctor about these medications  
  traMADol 50 mg tablet

## 2018-09-02 NOTE — IP AVS SNAPSHOT
[address] [facility] [address] [place] [address] 
[phone] Patient: Linh Weems MRN: [id number] QKC:[id number] About your hospitalization You were admitted on:  September 3, 2018 You last received care in the:  [place] [address] You were discharged on:  September 8, 2018 Why you were hospitalized Your primary diagnosis was:  Acute Hyponatremia Your diagnoses also included:  Leukocytosis, Anemia, Generalized Abdominal Pain Follow-up Information Follow up With Details Comments Contact Info Herrera Robertson MD On 9/12/2018 Hospital PCP f/u appointment om Wednesday September 12 @ 10:00 a.m. [facility] [address] [facility] 
[phone] Discharge Orders None A check nel indicates which time of day the medication should be taken. My Medications CONTINUE taking these medications Instructions Each Dose to Equal  
 Morning Noon Evening Bedtime  
 aspirin delayed-release 81 mg tablet Your last dose was: Your next dose is: Take 81 mg by mouth daily. 81 mg  
    
   
   
   
  
 calcium carbonate 500 mg calcium (1,250 mg) tablet Commonly known as:  OS-BLANE Your last dose was: Your next dose is: Take 2 Tabs by mouth daily. 2 Tab Cholecalciferol (Vitamin D3) 2,000 unit Cap capsule Commonly known as:  VITAMIN D3 Your last dose was: Your next dose is: Take 4,000 Units by mouth daily. 4000 Units LIPITOR 20 mg tablet Generic drug:  atorvastatin Your last dose was: Your next dose is: Take 20 mg by mouth nightly. 20 mg  
    
   
   
   
  
 lisinopril 10 mg tablet Commonly known as:  Khushi Escort Your last dose was: Your next dose is: Take 10 mg by mouth nightly.   
 10 mg  
    
   
   
   
  
 traMADol 50 mg tablet Commonly known as:  ULTRAM  
   
Your last dose was: Your next dose is: Take 1 Tab by mouth every six (6) hours as needed for Pain. Max Daily Amount: 200 mg.  
 50 mg  
    
   
   
   
  
 TYLENOL EXTRA STRENGTH 500 mg tablet Generic drug:  acetaminophen Your last dose was: Your next dose is: Take 1,000 mg by mouth every six (6) hours as needed for Pain. 1000 mg VITALINE COQ10 PO Your last dose was: Your next dose is: Take 200 mg by mouth daily. 200 mg  
    
   
   
   
  
 VITAMIN C 500 mg tablet Generic drug:  ascorbic acid (vitamin C) Your last dose was: Your next dose is: Take 500 mg by mouth daily. 500 mg  
    
   
   
   
  
  
STOP taking these medications SUPER B COMPLEX PO  
   
  
  
ASK your doctor about these medications Instructions Each Dose to Equal  
 Morning Noon Evening Bedtime  
 cephALEXin 500 mg capsule Commonly known as:  Morris Hayden Ask about: Should I take this medication? Your last dose was: Your next dose is: Take 1 Cap by mouth four (4) times daily for 5 days. 500 mg Where to Get Your Medications Information on where to get these meds will be given to you by the nurse or doctor. ! Ask your nurse or doctor about these medications  
  traMADol 50 mg tablet Opioid Education Prescription Opioids: What You Need to Know: 
 
Prescription opioids can be used to help relieve moderate-to-severe pain and are often prescribed following a surgery or injury, or for certain health conditions. These medications can be an important part of treatment but also come with serious risks. Opioids are strong pain medicines. Examples include hydrocodone, oxycodone, fentanyl, and morphine. Heroin is an example of an illegal opioid.   It is important to work with your health care provider to make sure you are getting the safest, most effective care. WHAT ARE THE RISKS AND SIDE EFFECTS OF OPIOID USE? Prescription opioids carry serious risks of addiction and overdose, especially with prolonged use. An opioid overdose, often marked by slow breathing, can cause sudden death. The use of prescription opioids can have a number of side effects as well, even when taken as directed. · Tolerance-meaning you might need to take more of a medication for the same pain relief · Physical dependence-meaning you have symptoms of withdrawal when the medication is stopped. Withdrawal symptoms can include nausea, sweating, chills, diarrhea, stomach cramps, and muscle aches. Withdrawal can last up to several weeks, depending on which drug you took and how long you took it. · Increased sensitivity to pain · Constipation · Nausea, vomiting, and dry mouth · Sleepiness and dizziness · Confusion · Depression · Low levels of testosterone that can result in lower sex drive, energy, and strength · Itching and sweating RISKS ARE GREATER WITH:      
· History of drug misuse, substance use disorder, or overdose · Mental health conditions (such as depression or anxiety) · Sleep apnea · Older age (72 years or older) · Pregnancy Avoid alcohol while taking prescription opioids. Also, unless specifically advised by your health care provider, medications to avoid include: · Benzodiazepines (such as Xanax or Valium) · Muscle relaxants (such as Soma or Flexeril) · Hypnotics (such as Ambien or Lunesta) · Other prescription opioids KNOW YOUR OPTIONS Talk to your health care provider about ways to manage your pain that don't involve prescription opioids. Some of these options may actually work better and have fewer risks and side effects. Options may include: 
· Pain relievers such as acetaminophen, ibuprofen, and naproxen · Some medications that are also used for depression or seizures · Physical therapy and exercise · Counseling to help patients learn how to cope better with triggers of pain and stress. · Application of heat or cold compress · Massage therapy · Relaxation techniques Be Informed Make sure you know the name of your medication, how much and how often to take it, and its potential risks & side effects. IF YOU ARE PRESCRIBED OPIOIDS FOR PAIN: 
· Never take opioids in greater amounts or more often than prescribed. Remember the goal is not to be pain-free but to manage your pain at a tolerable level. · Follow up with your primary care provider to: · Work together to create a plan on how to manage your pain. · Talk about ways to help manage your pain that don't involve prescription opioids. · Talk about any and all concerns and side effects. · Help prevent misuse and abuse. · Never sell or share prescription opioids · Help prevent misuse and abuse. · Store prescription opioids in a secure place and out of reach of others (this may include visitors, children, friends, and family). · Safely dispose of unused/unwanted prescription opioids: Find your community drug take-back program or your pharmacy mail-back program, or flush them down the toilet, following guidance from the Food and Drug Administration (www.fda.gov/Drugs/ResourcesForYou). · Visit www.cdc.gov/drugoverdose to learn about the risks of opioid abuse and overdose. · If you believe you may be struggling with addiction, tell your health care provider and ask for guidance or call [address] [place] [address] at 8-492-253-PBRC. Discharge Instructions Patient Discharge Instructions [facility] / [id number] : 1945 Admitted 2018 Discharged: 2018 Take Home Medications · It is important that you take the medication exactly as they are prescribed. · Keep your medication in the bottles provided by the pharmacist and keep a list of the medication names, dosages, and times to be taken in your wallet. · Do not take other medications without consulting your doctor. What to do at [facility] Recommended activity: No Lifting for 6 weeks. Follow-up with [facility]. Call for an appointment (if not already scheduled)            [phone]. Information obtained by : 
I understand that if any problems occur once I am at home I am to contact my physician. I understand and acknowledge receipt of the instructions indicated above. Physician's or R.N.'s Signature                                                                  Date/Time Patient or Representative Signature                                                          Date/Time Introducing [facility]! [employer] introduces Reachpod - [employer] Bilisim patient portal. Now you can access parts of your medical record, email your doctor's office, and request medication refills online. 1. In your internet browser, go to [email or web address] 2. Click on the First Time User? Click Here link in the Sign In box. You will see the New Member Sign Up page. 3. Enter your Reachpod [employer] Access Code exactly as it appears below. You will not need to use this code after youve completed the sign-up process. If you do not sign up before the expiration date, you must request a new code. · Reachpod - Inov[employer] Bilisim Access Code: [email or web address] Expires: 10/30/2018 10:15 AM 
 
4. Enter the last four digits of your Social Security Number (xxxx) and Date of Birth (mm/dd/yyyy) as indicated and click Submit.  You will be taken to the next sign-up page. 5. Create a TuneCoret ID. This will be your [employer] login ID and cannot be changed, so think of one that is secure and easy to remember. 6. Create a TuneCoret password. You can change your password at any time. 7. Enter your Password Reset Question and Answer. This can be used at a later time if you forget your password. 8. Enter your e-mail address. You will receive e-mail notification when new information is available in [address] 9. Click Sign Up. You can now view and download portions of your medical record. 10. Click the Download Summary menu link to download a portable copy of your medical information. If you have questions, please visit the Frequently Asked Questions section of the [employer] website. Remember, [employer] is NOT to be used for urgent needs. For medical emergencies, dial 911. Now available from your iPhone and Android! Introducing Zan Phillips As a [employer] patient, I wanted to make you aware of our electronic visit tool called Zan Phillips. [employer] 24/7 allows you to connect within minutes with a medical provider 24 hours a day, seven days a week via a mobile device or tablet or logging into a secure website from your computer. You can access Zan Phillips from anywhere in the [place]. A virtual visit might be right for you when you have a simple condition and feel like you just dont want to get out of bed, or cant get away from work for an appointment, when your regular [employer] provider is not available (evenings, weekends or holidays), or when youre out of town and need minor care. Electronic visits cost only $49 and if the [employer] 24/7 provider determines a prescription is needed to treat your condition, one can be electronically transmitted to a nearby pharmacy*. Please take a moment to enroll today if you have not already done so.   The enrollment process is free and takes just a few minutes. To enroll, please download the [employer] 24/7 keyla to your tablet or phone, or visit [email or web address] to enroll on your computer. And, as an [address] [place] [address] patient with a vushaper account, the results of your visits will be scanned into your electronic medical record and your primary care provider will be able to view the scanned results. We urge you to continue to see your regular [employer] provider for your ongoing medical care. And while your primary care provider may not be the one available when you seek a Hyper Wear virtual visit, the peace of mind you get from getting a real diagnosis real time can be priceless. For more information on Hyper Wear, view our Frequently Asked Questions (FAQs) at [email or web address]. Sincerely, 
 
Citlalli Lopez MD 
Chief Medical Officer Holland Rosales *:  certain medications cannot be prescribed via Hyper Wear Providers Seen During Your Hospitalization Provider Specialty Primary office phone Robyn Perez MD Emergency Medicine [phone] Barb Calvin MD Family Practice [phone] Gissel Sawyer MD Internal Medicine [phone] Jj Rodriguez MD Internal Medicine [phone] Lionel Preciado MD Internal Medicine [phone] Your Primary Care Physician (PCP) Primary Care Physician Office Phone Office Fax Sabino Freemanuch [phone] You are allergic to the following No active allergies Recent Documentation Height Weight BMI Smoking Status 1.727 m 78.4 kg 26.27 kg/m2 Former Smoker Emergency Contacts Name Discharge Info Relation Home Work Mobile [facility] DISCHARGE CAREGIVER [3] Spouse [3] [phone] David Milligan Jr DISCHARGE CAREGIVER [3] Son [22] [phone] Patient Belongings The following personal items are in your possession at time of discharge: 
  Dental Appliances: None, At home, Partials, Uppers  Visual Aid: Glasses      Home Medications: Sent home   Jewelry: None  Clothing: None    Other Valuables: None Please provide this summary of care documentation to your next provider. Signatures-by signing, you are acknowledging that this After Visit Summary has been reviewed with you and you have received a copy. Patient Signature:  ____________________________________________________________ Date:  ____________________________________________________________  
  
Danielle Sleight Provider Signature:  ____________________________________________________________ Date:  ____________________________________________________________

## 2018-09-03 ENCOUNTER — APPOINTMENT (OUTPATIENT)
Dept: CT IMAGING | Age: 73
DRG: 389 | End: 2018-09-03
Attending: EMERGENCY MEDICINE
Payer: MEDICARE

## 2018-09-03 PROBLEM — R10.84 GENERALIZED ABDOMINAL PAIN: Status: ACTIVE | Noted: 2018-09-03

## 2018-09-03 PROBLEM — D64.9 ANEMIA: Status: ACTIVE | Noted: 2018-09-03

## 2018-09-03 PROBLEM — D72.829 LEUKOCYTOSIS: Status: ACTIVE | Noted: 2018-09-03

## 2018-09-03 PROBLEM — E87.1 ACUTE HYPONATREMIA: Status: ACTIVE | Noted: 2018-09-03

## 2018-09-03 LAB
ALBUMIN SERPL-MCNC: 2.6 G/DL (ref 3.5–5)
ALBUMIN/GLOB SERPL: 0.7 {RATIO} (ref 1.1–2.2)
ALP SERPL-CCNC: 53 U/L (ref 45–117)
ALT SERPL-CCNC: 32 U/L (ref 12–78)
ANION GAP SERPL CALC-SCNC: 12 MMOL/L (ref 5–15)
APPEARANCE UR: CLEAR
AST SERPL-CCNC: 21 U/L (ref 15–37)
BACTERIA URNS QL MICRO: NEGATIVE /HPF
BASOPHILS # BLD: 0.1 K/UL (ref 0–0.1)
BASOPHILS NFR BLD: 0 % (ref 0–1)
BILIRUB SERPL-MCNC: 0.7 MG/DL (ref 0.2–1)
BILIRUB UR QL: NEGATIVE
BUN SERPL-MCNC: 15 MG/DL (ref 6–20)
BUN/CREAT SERPL: 15 (ref 12–20)
CALCIUM SERPL-MCNC: 8.4 MG/DL (ref 8.5–10.1)
CHLORIDE SERPL-SCNC: 95 MMOL/L (ref 97–108)
CO2 SERPL-SCNC: 23 MMOL/L (ref 21–32)
COLOR UR: ABNORMAL
COMMENT, HOLDF: NORMAL
CREAT SERPL-MCNC: 0.98 MG/DL (ref 0.7–1.3)
DIFFERENTIAL METHOD BLD: ABNORMAL
EOSINOPHIL # BLD: 0.1 K/UL (ref 0–0.4)
EOSINOPHIL NFR BLD: 1 % (ref 0–7)
EPITH CASTS URNS QL MICRO: ABNORMAL /LPF
ERYTHROCYTE [DISTWIDTH] IN BLOOD BY AUTOMATED COUNT: 14.6 % (ref 11.5–14.5)
GLOBULIN SER CALC-MCNC: 3.9 G/DL (ref 2–4)
GLUCOSE SERPL-MCNC: 143 MG/DL (ref 65–100)
GLUCOSE UR STRIP.AUTO-MCNC: NEGATIVE MG/DL
HCT VFR BLD AUTO: 25.9 % (ref 36.6–50.3)
HGB BLD-MCNC: 8.6 G/DL (ref 12.1–17)
HGB UR QL STRIP: ABNORMAL
IMM GRANULOCYTES # BLD: 0.2 K/UL (ref 0–0.04)
IMM GRANULOCYTES NFR BLD AUTO: 1 % (ref 0–0.5)
KETONES UR QL STRIP.AUTO: ABNORMAL MG/DL
LEUKOCYTE ESTERASE UR QL STRIP.AUTO: ABNORMAL
LYMPHOCYTES # BLD: 1.1 K/UL (ref 0.8–3.5)
LYMPHOCYTES NFR BLD: 8 % (ref 12–49)
MCH RBC QN AUTO: 30.4 PG (ref 26–34)
MCHC RBC AUTO-ENTMCNC: 33.2 G/DL (ref 30–36.5)
MCV RBC AUTO: 91.5 FL (ref 80–99)
MONOCYTES # BLD: 1.6 K/UL (ref 0–1)
MONOCYTES NFR BLD: 11 % (ref 5–13)
NEUTS SEG # BLD: 11.5 K/UL (ref 1.8–8)
NEUTS SEG NFR BLD: 79 % (ref 32–75)
NITRITE UR QL STRIP.AUTO: NEGATIVE
NRBC # BLD: 0.04 K/UL (ref 0–0.01)
NRBC BLD-RTO: 0.3 PER 100 WBC
PH UR STRIP: 6 [PH] (ref 5–8)
PLATELET # BLD AUTO: 399 K/UL (ref 150–400)
PMV BLD AUTO: 9.6 FL (ref 8.9–12.9)
POTASSIUM SERPL-SCNC: 4.6 MMOL/L (ref 3.5–5.1)
PROT SERPL-MCNC: 6.5 G/DL (ref 6.4–8.2)
PROT UR STRIP-MCNC: 100 MG/DL
RBC # BLD AUTO: 2.83 M/UL (ref 4.1–5.7)
RBC #/AREA URNS HPF: ABNORMAL /HPF (ref 0–5)
SAMPLES BEING HELD,HOLD: NORMAL
SODIUM SERPL-SCNC: 130 MMOL/L (ref 136–145)
SP GR UR REFRACTOMETRY: 1.01 (ref 1–1.03)
UA: UC IF INDICATED,UAUC: ABNORMAL
UROBILINOGEN UR QL STRIP.AUTO: 0.2 EU/DL (ref 0.2–1)
WBC # BLD AUTO: 14.6 K/UL (ref 4.1–11.1)
WBC URNS QL MICRO: ABNORMAL /HPF (ref 0–4)

## 2018-09-03 PROCEDURE — 51798 US URINE CAPACITY MEASURE: CPT

## 2018-09-03 PROCEDURE — 85025 COMPLETE CBC W/AUTO DIFF WBC: CPT | Performed by: EMERGENCY MEDICINE

## 2018-09-03 PROCEDURE — 65270000029 HC RM PRIVATE

## 2018-09-03 PROCEDURE — 74177 CT ABD & PELVIS W/CONTRAST: CPT

## 2018-09-03 PROCEDURE — 74011250636 HC RX REV CODE- 250/636: Performed by: EMERGENCY MEDICINE

## 2018-09-03 PROCEDURE — 74011636320 HC RX REV CODE- 636/320: Performed by: EMERGENCY MEDICINE

## 2018-09-03 PROCEDURE — 74011250636 HC RX REV CODE- 250/636: Performed by: FAMILY MEDICINE

## 2018-09-03 PROCEDURE — 74011000258 HC RX REV CODE- 258: Performed by: EMERGENCY MEDICINE

## 2018-09-03 PROCEDURE — 96375 TX/PRO/DX INJ NEW DRUG ADDON: CPT

## 2018-09-03 PROCEDURE — 94760 N-INVAS EAR/PLS OXIMETRY 1: CPT

## 2018-09-03 PROCEDURE — 36415 COLL VENOUS BLD VENIPUNCTURE: CPT | Performed by: EMERGENCY MEDICINE

## 2018-09-03 PROCEDURE — 96374 THER/PROPH/DIAG INJ IV PUSH: CPT

## 2018-09-03 PROCEDURE — 81001 URINALYSIS AUTO W/SCOPE: CPT | Performed by: FAMILY MEDICINE

## 2018-09-03 PROCEDURE — 80053 COMPREHEN METABOLIC PANEL: CPT | Performed by: EMERGENCY MEDICINE

## 2018-09-03 PROCEDURE — 74011250637 HC RX REV CODE- 250/637: Performed by: UROLOGY

## 2018-09-03 PROCEDURE — 87086 URINE CULTURE/COLONY COUNT: CPT | Performed by: FAMILY MEDICINE

## 2018-09-03 RX ORDER — FACIAL-BODY WIPES
10 EACH TOPICAL DAILY
Status: DISCONTINUED | OUTPATIENT
Start: 2018-09-03 | End: 2018-09-04

## 2018-09-03 RX ORDER — ONDANSETRON 2 MG/ML
INJECTION INTRAMUSCULAR; INTRAVENOUS
Status: DISPENSED
Start: 2018-09-03 | End: 2018-09-03

## 2018-09-03 RX ORDER — SODIUM CHLORIDE 0.9 % (FLUSH) 0.9 %
10 SYRINGE (ML) INJECTION
Status: COMPLETED | OUTPATIENT
Start: 2018-09-03 | End: 2018-09-03

## 2018-09-03 RX ORDER — FENTANYL CITRATE 50 UG/ML
25 INJECTION, SOLUTION INTRAMUSCULAR; INTRAVENOUS
Status: DISCONTINUED | OUTPATIENT
Start: 2018-09-03 | End: 2018-09-04

## 2018-09-03 RX ORDER — ONDANSETRON 2 MG/ML
4 INJECTION INTRAMUSCULAR; INTRAVENOUS
Status: DISCONTINUED | OUTPATIENT
Start: 2018-09-03 | End: 2018-09-08 | Stop reason: HOSPADM

## 2018-09-03 RX ORDER — SODIUM CHLORIDE 9 MG/ML
100 INJECTION, SOLUTION INTRAVENOUS CONTINUOUS
Status: DISCONTINUED | OUTPATIENT
Start: 2018-09-03 | End: 2018-09-07

## 2018-09-03 RX ORDER — ONDANSETRON 2 MG/ML
8 INJECTION INTRAMUSCULAR; INTRAVENOUS
Status: COMPLETED | OUTPATIENT
Start: 2018-09-03 | End: 2018-09-03

## 2018-09-03 RX ORDER — SODIUM CHLORIDE 0.9 % (FLUSH) 0.9 %
5-10 SYRINGE (ML) INJECTION AS NEEDED
Status: DISCONTINUED | OUTPATIENT
Start: 2018-09-03 | End: 2018-09-08 | Stop reason: HOSPADM

## 2018-09-03 RX ORDER — FENTANYL CITRATE 50 UG/ML
25 INJECTION, SOLUTION INTRAMUSCULAR; INTRAVENOUS
Status: COMPLETED | OUTPATIENT
Start: 2018-09-03 | End: 2018-09-03

## 2018-09-03 RX ORDER — SODIUM CHLORIDE 0.9 % (FLUSH) 0.9 %
5-10 SYRINGE (ML) INJECTION EVERY 8 HOURS
Status: DISCONTINUED | OUTPATIENT
Start: 2018-09-03 | End: 2018-09-08 | Stop reason: HOSPADM

## 2018-09-03 RX ADMIN — ONDANSETRON 8 MG: 2 INJECTION INTRAMUSCULAR; INTRAVENOUS at 00:49

## 2018-09-03 RX ADMIN — FENTANYL CITRATE 25 MCG: 50 INJECTION, SOLUTION INTRAMUSCULAR; INTRAVENOUS at 08:49

## 2018-09-03 RX ADMIN — SODIUM CHLORIDE 125 ML/HR: 900 INJECTION, SOLUTION INTRAVENOUS at 03:42

## 2018-09-03 RX ADMIN — SODIUM CHLORIDE 100 ML: 900 INJECTION, SOLUTION INTRAVENOUS at 01:40

## 2018-09-03 RX ADMIN — IOPAMIDOL 100 ML: 755 INJECTION, SOLUTION INTRAVENOUS at 01:40

## 2018-09-03 RX ADMIN — Medication 10 ML: at 08:50

## 2018-09-03 RX ADMIN — SODIUM CHLORIDE 1000 ML: 900 INJECTION, SOLUTION INTRAVENOUS at 06:14

## 2018-09-03 RX ADMIN — Medication 10 ML: at 14:38

## 2018-09-03 RX ADMIN — SODIUM CHLORIDE 125 ML/HR: 900 INJECTION, SOLUTION INTRAVENOUS at 08:50

## 2018-09-03 RX ADMIN — FENTANYL CITRATE 25 MCG: 50 INJECTION, SOLUTION INTRAMUSCULAR; INTRAVENOUS at 00:50

## 2018-09-03 RX ADMIN — FENTANYL CITRATE 25 MCG: 50 INJECTION, SOLUTION INTRAMUSCULAR; INTRAVENOUS at 04:25

## 2018-09-03 RX ADMIN — BISACODYL 10 MG: 10 SUPPOSITORY RECTAL at 14:38

## 2018-09-03 RX ADMIN — SODIUM CHLORIDE 125 ML/HR: 900 INJECTION, SOLUTION INTRAVENOUS at 18:09

## 2018-09-03 RX ADMIN — Medication 10 ML: at 01:40

## 2018-09-03 NOTE — PROGRESS NOTES
Primary Nurse Cherry Mendez RN and Cate Mobley RN performed a dual skin assessment on this patient. Impairment noted - left knee scar, lower midline incision with staples, left hip bruise, lower abdominal incision, right urostomy, and perineal incision under scrotum with small amount of bleeding. Vinnie score is 22.

## 2018-09-03 NOTE — ED NOTES
0138: Pt resting comfortably on stretcher. Denies needs at this time. Call light within reach, patient instructed on use. Family at bedside and updated on plan of care. Pt LAKESHA to CT. 
 
0150: Pt back from CT. 
 
0330: Pt resting comfortably on stretcher. Denies needs at this time. Call light within reach, patient instructed on use. 4025: Attempted to call report. Floor to call back. 12: TRANSFER - OUT REPORT: 
 
Verbal report given to RAJESH Garcia(name) on Last Gray  being transferred to [facility](unit) for routine progression of care Report consisted of patients Situation, Background, Assessment and  
Recommendations(SBAR). Information from the following report(s) SBAR, ED Summary, [facility] and Recent Results was reviewed with the receiving nurse. Lines:  
Peripheral IV 09/03/18 Left Antecubital (Active) Site Assessment Clean, dry, & intact 9/3/2018 12:06 AM  
Phlebitis Assessment 0 9/3/2018 12:06 AM  
Infiltration Assessment 0 9/3/2018 12:06 AM  
Dressing Status Clean, dry, & intact 9/3/2018 12:06 AM  
Dressing Type Transparent 9/3/2018 12:06 AM  
  
 
Opportunity for questions and clarification was provided.    
 
Patient transported with: 
 
Leander Johnson RN

## 2018-09-03 NOTE — PROGRESS NOTES
Patient admitted this morning for possible Post OP ileus; S/p cystoprostatectomy, urethrectomy, extended bilateral pelvic lymphadenectomy, ileal conduit and urinary diversion for bladder cancer on 08/27/2018 by Dr. Diego Vazquez Urology eval appreciated. Patient started to have BM since this afternoon, had 3BMs thus far Will start clear liquid diet.

## 2018-09-03 NOTE — ED PROVIDER NOTES
HPI  
 
74year old male with history of arthritis, bladder cancer s/p Radical cystoprostatectomy, urethrectomy, extended bilateral pelvic lymphadenectomy, ileal conduit, and urinary diversion by Dr. Clare Lindo on 8/27, GERD, HTN, presents with severe abdominal pain and no bowel movement post surgery. Tried magnesium citrate with no success and increased pain. Nausea no vomiting. Temp 99 earlier today- took tylenol. On tramadol. Urine flowing from back ok. Wounds look good expect for scrotum wound continues to ooze blood. Past Medical History:  
Diagnosis Date  Adverse effect of anesthesia Zee Schooling  Arthritis HANDS  Cancer ([facility] [address] 2018 SKIN,BLADDER  
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  
Procedure Laterality Date  HX GI    
 COLONOSCOPY  
 HX KNEE REPLACEMENT  2012 [facility]  HX ORTHOPAEDIC  2018 BACK -LUMBAR DISCECTOMY  HX OTHER SURGICAL  2015 LESION REMOVED FROM FACE AND CHEST TO REMOVE LYMPH NODES  
 HX UROLOGICAL BLADDER BIOPSIES X 12 Family History:  
Problem Relation Age of Onset  Heart Disease Mother  Cancer Mother LIVER  
 Diabetes Mother  Heart Attack Father  No Known Problems Sister  Elevated Lipids Brother  Heart Disease Brother STENTS PLACED  Anesth Problems Neg Hx Social History Social History  Marital status:  Spouse name: N/A  
 Number of children: N/A  
 Years of education: N/A Occupational History  Not on file. Social History Main Topics  Smoking status: Former Smoker Quit date: 8/21/1985  Smokeless tobacco: Former User Quit date: 2013 Comment: QUIT 25 YEARS AGO  Alcohol use Yes Comment: RARELY  Drug use: No  
 Sexual activity: Not on file Other Topics Concern  Not on file Social History Narrative ALLERGIES: Review of patient's allergies indicates no known allergies. Review of Systems Constitutional: Positive for fever. HENT: Negative for congestion. Eyes: Negative for visual disturbance. Respiratory: Negative for cough and shortness of breath. Cardiovascular: Negative for chest pain. Gastrointestinal: Positive for abdominal pain, constipation and nausea. Negative for vomiting. Endocrine: Negative for polyuria. Genitourinary: Positive for scrotal swelling. Negative for hematuria. Musculoskeletal: Negative for gait problem. Skin: Negative for rash. Neurological: Negative for headaches. Psychiatric/Behavioral: Negative for dysphoric mood. There were no vitals filed for this visit. Physical Exam  
Constitutional: He is oriented to person, place, and time. He appears well-developed and well-nourished. No distress. HENT:  
Head: Normocephalic and atraumatic. Mouth/Throat: Oropharynx is clear and moist. No oropharyngeal exudate. Eyes: Conjunctivae and EOM are normal. Pupils are equal, round, and reactive to light. Right eye exhibits no discharge. Left eye exhibits no discharge. No scleral icterus. Neck: Normal range of motion. Neck supple. No JVD present. Cardiovascular: Normal rate, regular rhythm, normal heart sounds and intact distal pulses. Exam reveals no gallop and no friction rub. No murmur heard. Pulmonary/Chest: Effort normal and breath sounds normal. No stridor. No respiratory distress. He has no wheezes. He has no rales. He exhibits no tenderness. Abdominal: Soft. Bowel sounds are normal. He exhibits distension. He exhibits no mass. There is tenderness. There is no rebound and no guarding. Musculoskeletal: Normal range of motion. He exhibits no edema or tenderness. Neurological: He is alert and oriented to person, place, and time. He has normal reflexes. No cranial nerve deficit. He exhibits normal muscle tone.  Coordination normal.  
 Skin: Skin is warm and dry. No rash noted. No erythema. Abdominal wounds are clean/dry/intact Scrotal wound without edema, scant bloody drainage Psychiatric: He has a normal mood and affect. His behavior is normal. Judgment and thought content normal.  
  
 
MDM 
 
 
ED Course Procedures CT c/w ileus. Patient better with meds. Urologist had called in earlier about patient and if not surgical obstruction, I.e. If ileus, please admit to hospitalist. I have tiger texted the hospitalst for admission.

## 2018-09-03 NOTE — H&P
[address]  HISTORY AND PHYSICAL Laurey Jeans 
MR#: [id number] : 1945 ACCOUNT #: [de-identified] ADMIT DATE: 2018 CHIEF COMPLAINT:  Abdominal pain. HISTORY OF PRESENT ILLNESS:  A 57-year-old white male with past medical history of bladder cancer, arthritis, skin cancer, prior compartment syndrome with left foot with foot drop, GERD and hypertension, presented to the emergency department from home accompanied by his wife with chief complaint of generalized abdominal pain. Of note, the patient is status post radical cystoprostatectomy, urethrectomy, extended bilateral pelvic lymphadenectomy, ileal conduit and urinary diversion for bladder cancer on 2018 by Dr. Violette Sotelo. The patient notes he has been having ongoing generalized abdominal pain that radiates towards his back. At worst, it is rated as 10/10, unchanged since surgery. He notes that he was discharged home and had to return. He has had nausea (without vomiting). His wife notes the patient has had poor oral intake, only drinking liquids such as Ensure, but only in small amounts, and very little solid food. The patient's wife notes the patient has not had a bowel movement in a whole week. The patient admits to abdominal distention, which has worsened. The pain, as mentioned, has been severe, aggravated with any touch or movement without specific alleviating factors. The pain is noted to be a dull aching. On arrival to the emergency department tonight, initial recorded vital signs, blood pressure 142/71, heart rate 85, respiratory rate of 16, O2 saturation 92% on room air. Workup included labs showing an elevated WBC at 14,600, compared to last WBC of 8.2 on 2018. Per the ED, the patient was given 0.9% normal saline IV fluids at 125 mL per hour, fentanyl 25 mcg IV and Zofran 8 mg IV.   Hospitalist is now asked to admit the patient to the medical service for continued evaluation and treatment. The patient does not complain of any new onset dizziness, lightheadedness, focal weakness, numbness, paresthesias, clear speech, facial droop, headache, neck pain, chest pain, shortness of breath, cough, congestion, palpitations, vomiting, diarrhea, melena, hematuria, calf pain, swelling, edema, fever, chills, rash. The patient's wife notes that the patient has had bloody drainage from his \"scrotum. \"  The patient does not report any recent trauma. PAST MEDICAL HISTORY:   
1. Arthritis. 2.  Skin cancer. 3.  Bladder cancer. 4.  Compartment syndrome of the left foot. 5.  Foot drop. 6.  GERD. 7.  Hypertension. PAST SURGICAL HISTORY:   
1.  Radical cystoprostatectomy, urethrectomy, extended bilateral pelvic lymphadenectomy, ileal conduit, urinary diversion, 08/27/2018. 2.  Colonoscopy. 3.  Left total knee replacement. 4.  Lumbar laminectomy. 5.  Removal of lesions from face and chest. 
6.  Lymphadenectomy. 7.  Bladder biopsies x12. MEDICATIONS:  Complete medication list reviewed and noted on chart record. Taking Last Dose Start Date End Date Provider    
   acetaminophen (TYLENOL EXTRA STRENGTH) 500 mg tablet  9/2/2018  --  --  Historical Provider  
   Take 1,000 mg by mouth every six (6) hours as needed for Pain.  
   ascorbic acid, vitamin C, (VITAMIN C) 500 mg tablet    --  --  Historical Provider  
   Take 500 mg by mouth daily.  
   Notes:   Pt instructed to stop this medication 7 days prior to surgery per protocol  
   aspirin delayed-release 81 mg tablet    --  --  Historical Provider  
   Take 81 mg by mouth daily.  
   Notes:   Pt instructed to stop this medication 7 days prior to surgery per protocol  
   atorvastatin (LIPITOR) 20 mg tablet    --  --  Historical Provider  
   Take 20 mg by mouth nightly.  
   calcium carbonate (OS-BLANE) 500 mg calcium (1,250 mg) tablet  8/27/2018  --  --  Historical Provider    Take 2 Tabs by mouth daily.  
   cephALEXin (KEFLEX) 500 mg capsule  9/3/2018  08/31/18  09/05/18 Kristen Mckeon MD  
   Take 1 Cap by mouth four (4) times daily for 5 days.  
   Cholecalciferol, Vitamin D3, (VITAMIN D3) 2,000 unit cap capsule  8/27/2018  --  --  Historical Provider  
   Take 4,000 Units by mouth daily.  
   ferrous fumarate/vit Bcomp,C (SUPER B COMPLEX PO)  8/27/2018  --  --  Historical Provider  
   Take 1 Tab by mouth daily.  
   lisinopril (PRINIVIL, ZESTRIL) 10 mg tablet  8/27/2018  --  --  Historical Provider  
   Take 10 mg by mouth nightly.  
   traMADol (ULTRAM) 50 mg tablet  9/2/2018 08/31/18  -- Kristen Mckeon MD  
   Take 1 Tab by mouth every six (6) hours as needed for Pain. Max Daily Amount: 200 mg.  
   ubidecarenone (VITALINE COQ10 PO)    --  --  Historical Provider  
   Take 200 mg by mouth daily.  
   Notes:   Pt instructed to stop this medication 7 days prior to surgery per protocol  
   
 
 
ALLERGIES:  NO KNOWN DRUG ALLERGIES. SOCIAL HISTORY:  Reportedly quit smoking cigarettes 25 years ago. Rarely drinks alcohol per report. No report of illicit drug use. He is . FAMILY HISTORY:  Heart disease in mother, brother and father. Diabetes, mother. Liver cancer, mother. Elevated lipids, brother. REVIEW OF SYSTEMS:  Pertinent positives as noted in HPI. All other systems were reviewed and negative. PHYSICAL EXAMINATION: 
VITAL SIGNS:  Temperature 98.4 degrees Fahrenheit, blood pressure 119/58, heart rate 72, respiratory rate of 14, O2 saturation 94% on room air. Recorded weight 177 pounds (80.3 kg). Recorded height of 5 feet 8 inches tall. GENERAL:  The patient in no acute respiratory distress. PSYCHIATRIC:  The patient was awake and oriented x3. NEUROLOGIC:  GCS of 15. Moves extremities x4. Sensation grossly intact. No slurred speech or facial droop. HEENT:  Normocephalic, atraumatic. PERRLA is intact. Sclerae anicteric. Conjunctivae clear. Nares are patent. Oropharynx clear. Tongue is midline, nonedematous. NECK:  Without lymphadenopathy, JVD, carotid bruits or thyromegaly. LYMPHATICS:  Negative for cervical or supraclavicular lymphadenopathy. RESPIRATORY:  Lungs clear to auscultation bilaterally. CARDIOVASCULAR:  Heart regular rhythm with normal S1, S2, without murmurs, rubs or gallops. GASTROINTESTINAL:  Abdomen is soft, distended, generalized tenderness with palpitation without rebound, guarding, or rigidity. No auscultated abdominal bruits. The patient has high pitched bowel sounds. Ileal conduit stoma site is pink and viable in the right lower quadrant. Urine is dark concentrated. Midline abdominal incision is intact without any drainage. Left lower quadrant incision site is intact. BACK:  No CVA tenderness. No step-off deformity. GENITOURINARY:  Scrotal nontender. Perineal wound and surgical incision site has bloody drainage noted on both the wound, as well as the dressings. Tender to palpation in that area with erythema. MUSCULOSKELETAL:  No acute palpable bony deformities. Negative for calf tenderness. VASCULAR:  Radial 2+, 1+ dorsalis pedis pulses without cyanosis, clubbing or edema. SKIN:  Warm and dry. LABORATORY DATA:  Reviewed. Sodium 130, potassium 4.3, chloride 95, CO2 of 23, BUN 15, creatinine 0.98, glucose of 143, anion gap 12, calcium of 8.4, GFR greater than 60, total bilirubin 0.7, total protein 6.5, albumin 2.6, ALT is 32, AST 21, alkaline phosphatase 53. WBC 14.6, hemoglobin 8.6, hematocrit 25.9, platelets are 922, neutrophils of 79%. CT abdomen and pelvis with IV contrast was also reviewed and noted in HPI. IMPRESSION AND PLAN: 
1. Ileus suspected based on CT report as well as the patient's presentation of constipation. No bowel movements in a week. Admit the patient to medical floor. Keep n.p.o. on IV fluids.   Urologist has been consulted in light of recent surgery. Consider for GI and/or surgical consultation for noted bowel ileus. Encourage activity, movement to help stimulate bowel movement. 2.  Leukocytosis. Repeat CBC. The patient is afebrile. The patient's perineal surgical incision site has bloody drainage with some erythema. Consult Urology to evaluate for any possible infection. Interim, repeat CBC. Monitor temperature closely. Consider for IV antibiotics. 3.  Anemia. Repeat H and H, check iron profile, B12, and folate levels. Check stool. 4.  Acute hyponatremia. Repeat sodium levels. Proceed to a slow correction of sodium. 5.  Bladder cancer status post radical cystoprostatectomy, urethrectomy, lymphadenectomy, ileal conduit, and urinary diversion. Plan as noted above. Continue with wound cares. 6.  Generalized abdominal pain. Provide pain management and supportive cares. Ordered fentanyl 25 mcg IV q. 4  hours p.r.n. 
7.  Nausea. Provide Zofran 4 mg IV q. 6 hours p.r.n. 
8.  Abdominal distention. Plan as noted above. 9.  Hematoma noted in bladder resection bed status post surgery. Consult with urologist to evaluate the same. 9.  Gait abnormality, currently using a walker. Order physical and occupational therapy for ambulation. 10.  Hypertension. Monitor blood pressure closely and provide antihypertensive meds as needed. 11.  Hyperlipidemia. Currently n.p.o. Resume back on home medicines once cleared for oral intake. 12.  Venous thromboembolism prophylaxis. SCDs to bilateral lower extremities. MD WIN Jeffery/ONEYDA 
D: 09/03/2018 05:06    
T: 09/03/2018 06:29 
JOB #: [id number]

## 2018-09-03 NOTE — PROGRESS NOTES
TRANSFER - IN REPORT: 
 
Verbal report received from Fausto Diaz RN(name) on Laverne Edmonds  being received from ED(unit) for routine progression of care Report consisted of patients Situation, Background, Assessment and  
Recommendations(SBAR). Information from the following report(s) SBAR, Kardex, ED Summary, Procedure Summary, Intake/Output, MAR and Recent Results was reviewed with the receiving nurse. Opportunity for questions and clarification was provided. Assessment completed upon patients arrival to unit and care assumed.

## 2018-09-03 NOTE — PROGRESS NOTES
Problem: Falls - Risk of 
Goal: *Absence of Falls Document Wilfred Hurtado Fall Risk and appropriate interventions in the flowsheet. Outcome: Progressing Towards Goal 
Fall Risk Interventions: 
  
Call bell and belongings within patient reach. Bed in low position and all wheels locked Side rails up x 3. Gripper socks when OOB and educated to call for assistance.

## 2018-09-03 NOTE — PROGRESS NOTES
Primary Nurse Fabricio Soni RN and Rosanne Moreno RN performed a dual skin assessment on this patient Impairment noted- see wound doc flow sheet Vinnie score is 16. Left knee scar. Lower midline abdominal staples with dressing intact. Left small incision that is closed on the lower abdomen,dressing is covering the area. Right urostomy. Perineum incision that is bleeding with scant amount of red blood underneath scrotum area. Scrotum is swollen.

## 2018-09-03 NOTE — PROGRESS NOTES
Bedside shift change report given to [address] [place] [address] Silvestre (oncoming nurse) by Scott Rosales (offgoing nurse). Report included the following information SBAR, MAR, Recent Results and Med Rec Status.

## 2018-09-03 NOTE — CONSULTS
Urology Consult    Patient: Ivanna Han MRN: [id number]  SSN: [id number]    YOB: 1945  Age: 74 y.o. Sex: male          Date of Consultation:  September 3, 2018  Requesting Physician: Hoang Brunner MD  Reason for Consultation:    Pre-existing [facility] Patient:   Physician: Dr. Guerrero Harp:  Elevated WBC--improving. No signs of infection    Ileus--NPO until bowel activity, dulcolax suppository. Limit narcotics. Will consider toradol but hold for now (cr bumped slightly when he got some last week)    Post cystectomy--no other active issues--stents ok and inc and stoma look good. Was taking Keflex at home but without signs of infection will hold. Ucx pending. Dr. Marvel Grant to pick back up tomorrow. History of Present Illness:  Patient is a 74 y.o. male admitted 9/2/2018 to the hospital for Generalized abdominal pain  Leukocytosis  Anemia  Acute hyponatremia. He had cystoprostatectomy, urethrectomy, BPLND last Monday. Comes back with clinical picture of ileus. Minimal flatus and no BM. No fever. Abd distended. Nontender. No n/v.  CT confirms ileus. Incisions intact, stoma intact. Stents in place. Pelvic hematoma(does not seem more than what would be expected 1 week postop). Doing better with hydration    Past Medical History:  No Known Allergies   Prior to Admission medications    Medication Sig Start Date End Date Taking? Authorizing Provider   cephALEXin (KEFLEX) 500 mg capsule Take 1 Cap by mouth four (4) times daily for 5 days. 8/31/18 9/5/18 Yes Rodney Lawrence MD   traMADol Felipe Coral) 50 mg tablet Take 1 Tab by mouth every six (6) hours as needed for Pain. Max Daily Amount: 200 mg. 8/31/18  Yes Rodney Lawrence MD   Cholecalciferol, Vitamin D3, (VITAMIN D3) 2,000 unit cap capsule Take 4,000 Units by mouth daily. Yes Historical Provider   calcium carbonate (OS-BLANE) 500 mg calcium (1,250 mg) tablet Take 2 Tabs by mouth daily.    Yes Historical Provider   ferrous fumarate/vit Bcomp,C (SUPER B COMPLEX PO) Take 1 Tab by mouth daily. Yes Historical Provider   acetaminophen (TYLENOL EXTRA STRENGTH) 500 mg tablet Take 1,000 mg by mouth every six (6) hours as needed for Pain. Yes Historical Provider   lisinopril (PRINIVIL, ZESTRIL) 10 mg tablet Take 10 mg by mouth nightly. Yes Historical Provider   atorvastatin (LIPITOR) 20 mg tablet Take 20 mg by mouth nightly. Historical Provider   ubidecarenone (VITALINE COQ10 PO) Take 200 mg by mouth daily. Historical Provider   aspirin delayed-release 81 mg tablet Take 81 mg by mouth daily. Historical Provider   ascorbic acid, vitamin C, (VITAMIN C) 500 mg tablet Take 500 mg by mouth daily. Historical Provider      PMHx:  has a past medical history of Adverse effect of anesthesia; Arthritis; Cancer ([facility] [address] (2018); Compartment syndrome of foot, left, sequela (1982); Foot drop; GERD (gastroesophageal reflux disease); and Hypertension. PSurgHx:  has a past surgical history that includes hx other surgical (); hx gi; hx urological; hx orthopaedic (2018); and hx knee replacement (). PSocHx:  reports that he quit smoking about 33 years ago. He quit smokeless tobacco use about 5 years ago. He reports that he drinks alcohol. He reports that he does not use illicit drugs. ROS:  Admission ROS by Alex Brunson MD from 2018 were reviewed with the patient and changes (other than per HPI) include: none. All 12 systems were reviewed and were otherwise negative. Physical Exam:            Vitals[de-identified]    Temp (24hrs), Av.1 °F (36.7 °C), Min:97.8 °F (36.6 °C), Max:98.4 °F (36.9 °C)   Blood pressure 138/75, pulse 89, temperature 97.8 °F (36.6 °C), resp. rate 20, height 5' 8\" (1.727 m), weight 78.4 kg (172 lb 12.8 oz), SpO2 98 %.              I&O's:     07 -  1900  In: -   Out: 250 [Urine:250]   General Well developed, NAD   Conjunctiva/Lids Normal without gross defects Pupil/Iris Pupils equal, round, reactive   External Ears/Nose No lesions or deformities   Hearing  Grossly intact           Respiratory Effort Breathing easily               Abdomen / Flank Distended, non tender, without masses, no CVA tenderness                               Judgement / Insight intact   Mood / Affect normal     Lab Results   Component Value Date/Time    WBC 14.6 (H) 09/03/2018 12:05 AM    HCT 25.9 (L) 09/03/2018 12:05 AM    PLATELET 037 77/95/2457 12:05 AM    Sodium 130 (L) 09/03/2018 12:05 AM    Potassium 4.6 09/03/2018 12:05 AM    Chloride 95 (L) 09/03/2018 12:05 AM    CO2 23 09/03/2018 12:05 AM    BUN 15 09/03/2018 12:05 AM    Creatinine 0.98 09/03/2018 12:05 AM    Glucose 143 (H) 09/03/2018 12:05 AM    Calcium 8.4 (L) 09/03/2018 12:05 AM    Magnesium 2.1 08/30/2018 03:45 AM    INR 1.1 08/21/2018 12:46 PM       UA:   Lab Results   Component Value Date/Time    Color YELLOW/STRAW 09/03/2018 04:27 AM    Appearance CLEAR 09/03/2018 04:27 AM    Specific gravity 1.010 09/03/2018 04:27 AM    Specific gravity 1.012 08/21/2018 12:46 PM    pH (UA) 6.0 09/03/2018 04:27 AM    Protein 100 (A) 09/03/2018 04:27 AM    Glucose NEGATIVE  09/03/2018 04:27 AM    Ketone TRACE (A) 09/03/2018 04:27 AM    Bilirubin NEGATIVE  09/03/2018 04:27 AM    Urobilinogen 0.2 09/03/2018 04:27 AM    Nitrites NEGATIVE  09/03/2018 04:27 AM    Leukocyte Esterase MODERATE (A) 09/03/2018 04:27 AM    Epithelial cells FEW 09/03/2018 04:27 AM    Bacteria NEGATIVE  09/03/2018 04:27 AM    WBC 10-20 09/03/2018 04:27 AM    RBC 20-50 09/03/2018 04:27 AM       Cultures:   Xrays:     Signed By: Jordan Robin MD  - September 3, 2018

## 2018-09-03 NOTE — ED TRIAGE NOTES
Triage: Pt arrived by EMS c/o abdominal pain. Pt had prostate removal procedure with urostomy bag placed for cancer on Monday and has not have a BM since last Sunday.  Pt took tramadol prior to arrival.

## 2018-09-04 ENCOUNTER — APPOINTMENT (OUTPATIENT)
Dept: CT IMAGING | Age: 73
DRG: 389 | End: 2018-09-04
Attending: UROLOGY
Payer: MEDICARE

## 2018-09-04 ENCOUNTER — APPOINTMENT (OUTPATIENT)
Dept: CT IMAGING | Age: 73
DRG: 389 | End: 2018-09-04
Attending: NURSE PRACTITIONER
Payer: MEDICARE

## 2018-09-04 ENCOUNTER — APPOINTMENT (OUTPATIENT)
Dept: MRI IMAGING | Age: 73
DRG: 389 | End: 2018-09-04
Attending: NURSE PRACTITIONER
Payer: MEDICARE

## 2018-09-04 LAB
ANION GAP SERPL CALC-SCNC: 9 MMOL/L (ref 5–15)
ARTERIAL PATENCY WRIST A: YES
ATRIAL RATE: 96 BPM
BACTERIA SPEC CULT: NORMAL
BASE EXCESS BLD CALC-SCNC: 0 MMOL/L
BASOPHILS # BLD: 0 K/UL (ref 0–0.1)
BASOPHILS NFR BLD: 0 % (ref 0–1)
BDY SITE: ABNORMAL
BUN SERPL-MCNC: 14 MG/DL (ref 6–20)
BUN/CREAT SERPL: 15 (ref 12–20)
CALCIUM SERPL-MCNC: 7.2 MG/DL (ref 8.5–10.1)
CALCULATED P AXIS, ECG09: 24 DEGREES
CALCULATED R AXIS, ECG10: 14 DEGREES
CALCULATED T AXIS, ECG11: 6 DEGREES
CC UR VC: NORMAL
CHLORIDE SERPL-SCNC: 101 MMOL/L (ref 97–108)
CO2 SERPL-SCNC: 24 MMOL/L (ref 21–32)
CREAT SERPL-MCNC: 0.92 MG/DL (ref 0.7–1.3)
D DIMER PPP FEU-MCNC: 5.94 MG/L FEU (ref 0–0.65)
DIAGNOSIS, 93000: NORMAL
DIFFERENTIAL METHOD BLD: ABNORMAL
EOSINOPHIL # BLD: 0.3 K/UL (ref 0–0.4)
EOSINOPHIL NFR BLD: 3 % (ref 0–7)
ERYTHROCYTE [DISTWIDTH] IN BLOOD BY AUTOMATED COUNT: 14.7 % (ref 11.5–14.5)
GAS FLOW.O2 O2 DELIVERY SYS: ABNORMAL L/MIN
GAS FLOW.O2 SETTING OXYMISER: 4 L/M
GLUCOSE BLD STRIP.AUTO-MCNC: 97 MG/DL (ref 65–100)
GLUCOSE SERPL-MCNC: 90 MG/DL (ref 65–100)
HCO3 BLD-SCNC: 21.1 MMOL/L (ref 22–26)
HCT VFR BLD AUTO: 21.2 % (ref 36.6–50.3)
HGB BLD-MCNC: 6.8 G/DL (ref 12.1–17)
IMM GRANULOCYTES # BLD: 0.1 K/UL (ref 0–0.04)
IMM GRANULOCYTES NFR BLD AUTO: 1 % (ref 0–0.5)
INR PPP: 1.1 (ref 0.9–1.1)
LACTATE SERPL-SCNC: 1.8 MMOL/L (ref 0.4–2)
LYMPHOCYTES # BLD: 1.4 K/UL (ref 0.8–3.5)
LYMPHOCYTES NFR BLD: 13 % (ref 12–49)
MCH RBC QN AUTO: 30 PG (ref 26–34)
MCHC RBC AUTO-ENTMCNC: 32.1 G/DL (ref 30–36.5)
MCV RBC AUTO: 93.4 FL (ref 80–99)
MONOCYTES # BLD: 1.3 K/UL (ref 0–1)
MONOCYTES NFR BLD: 12 % (ref 5–13)
NEUTS SEG # BLD: 7.9 K/UL (ref 1.8–8)
NEUTS SEG NFR BLD: 71 % (ref 32–75)
NRBC # BLD: 0 K/UL (ref 0–0.01)
NRBC BLD-RTO: 0 PER 100 WBC
P-R INTERVAL, ECG05: 150 MS
PCO2 BLD: 20.7 MMHG (ref 35–45)
PH BLD: 7.62 [PH] (ref 7.35–7.45)
PLATELET # BLD AUTO: 360 K/UL (ref 150–400)
PMV BLD AUTO: 9.8 FL (ref 8.9–12.9)
PO2 BLD: 145 MMHG (ref 80–100)
POTASSIUM SERPL-SCNC: 4.4 MMOL/L (ref 3.5–5.1)
PROTHROMBIN TIME: 11.4 SEC (ref 9–11.1)
Q-T INTERVAL, ECG07: 370 MS
QRS DURATION, ECG06: 76 MS
QTC CALCULATION (BEZET), ECG08: 467 MS
RBC # BLD AUTO: 2.27 M/UL (ref 4.1–5.7)
RBC MORPH BLD: ABNORMAL
SAO2 % BLD: 100 % (ref 92–97)
SERVICE CMNT-IMP: NORMAL
SERVICE CMNT-IMP: NORMAL
SODIUM SERPL-SCNC: 134 MMOL/L (ref 136–145)
SPECIMEN TYPE: ABNORMAL
TOTAL RESP. RATE, ITRR: 22
VENTRICULAR RATE, ECG03: 96 BPM
WBC # BLD AUTO: 11 K/UL (ref 4.1–11.1)

## 2018-09-04 PROCEDURE — 77030012893

## 2018-09-04 PROCEDURE — 70551 MRI BRAIN STEM W/O DYE: CPT

## 2018-09-04 PROCEDURE — 86923 COMPATIBILITY TEST ELECTRIC: CPT | Performed by: NURSE PRACTITIONER

## 2018-09-04 PROCEDURE — 82803 BLOOD GASES ANY COMBINATION: CPT

## 2018-09-04 PROCEDURE — 82962 GLUCOSE BLOOD TEST: CPT

## 2018-09-04 PROCEDURE — 70450 CT HEAD/BRAIN W/O DYE: CPT

## 2018-09-04 PROCEDURE — 85610 PROTHROMBIN TIME: CPT | Performed by: NURSE PRACTITIONER

## 2018-09-04 PROCEDURE — 93041 RHYTHM ECG TRACING: CPT

## 2018-09-04 PROCEDURE — P9040 RBC LEUKOREDUCED IRRADIATED: HCPCS | Performed by: NURSE PRACTITIONER

## 2018-09-04 PROCEDURE — 71275 CT ANGIOGRAPHY CHEST: CPT

## 2018-09-04 PROCEDURE — 36430 TRANSFUSION BLD/BLD COMPNT: CPT

## 2018-09-04 PROCEDURE — 36415 COLL VENOUS BLD VENIPUNCTURE: CPT | Performed by: INTERNAL MEDICINE

## 2018-09-04 PROCEDURE — 74011000258 HC RX REV CODE- 258: Performed by: INTERNAL MEDICINE

## 2018-09-04 PROCEDURE — 94760 N-INVAS EAR/PLS OXIMETRY 1: CPT

## 2018-09-04 PROCEDURE — 74011250636 HC RX REV CODE- 250/636: Performed by: UROLOGY

## 2018-09-04 PROCEDURE — 77010033678 HC OXYGEN DAILY

## 2018-09-04 PROCEDURE — 74011250636 HC RX REV CODE- 250/636: Performed by: EMERGENCY MEDICINE

## 2018-09-04 PROCEDURE — 74011636320 HC RX REV CODE- 636/320: Performed by: INTERNAL MEDICINE

## 2018-09-04 PROCEDURE — 83605 ASSAY OF LACTIC ACID: CPT | Performed by: NURSE PRACTITIONER

## 2018-09-04 PROCEDURE — 80048 BASIC METABOLIC PNL TOTAL CA: CPT | Performed by: INTERNAL MEDICINE

## 2018-09-04 PROCEDURE — 85025 COMPLETE CBC W/AUTO DIFF WBC: CPT | Performed by: INTERNAL MEDICINE

## 2018-09-04 PROCEDURE — 70496 CT ANGIOGRAPHY HEAD: CPT

## 2018-09-04 PROCEDURE — P9016 RBC LEUKOCYTES REDUCED: HCPCS | Performed by: NURSE PRACTITIONER

## 2018-09-04 PROCEDURE — 74011250636 HC RX REV CODE- 250/636: Performed by: FAMILY MEDICINE

## 2018-09-04 PROCEDURE — 74011250637 HC RX REV CODE- 250/637: Performed by: UROLOGY

## 2018-09-04 PROCEDURE — 85379 FIBRIN DEGRADATION QUANT: CPT | Performed by: NURSE PRACTITIONER

## 2018-09-04 PROCEDURE — 93880 EXTRACRANIAL BILAT STUDY: CPT

## 2018-09-04 PROCEDURE — 93970 EXTREMITY STUDY: CPT

## 2018-09-04 PROCEDURE — 30233N1 TRANSFUSION OF NONAUTOLOGOUS RED BLOOD CELLS INTO PERIPHERAL VEIN, PERCUTANEOUS APPROACH: ICD-10-PCS | Performed by: UROLOGY

## 2018-09-04 PROCEDURE — 93306 TTE W/DOPPLER COMPLETE: CPT

## 2018-09-04 PROCEDURE — 77030027138 HC INCENT SPIROMETER -A

## 2018-09-04 PROCEDURE — 65270000029 HC RM PRIVATE

## 2018-09-04 PROCEDURE — 36600 WITHDRAWAL OF ARTERIAL BLOOD: CPT

## 2018-09-04 PROCEDURE — 86900 BLOOD TYPING SEROLOGIC ABO: CPT | Performed by: NURSE PRACTITIONER

## 2018-09-04 RX ORDER — GLYCERIN ADULT
1 SUPPOSITORY, RECTAL RECTAL
Status: COMPLETED | OUTPATIENT
Start: 2018-09-04 | End: 2018-09-04

## 2018-09-04 RX ORDER — ACETAMINOPHEN 10 MG/ML
1000 INJECTION, SOLUTION INTRAVENOUS EVERY 6 HOURS
Status: COMPLETED | OUTPATIENT
Start: 2018-09-04 | End: 2018-09-05

## 2018-09-04 RX ORDER — SODIUM CHLORIDE 0.9 % (FLUSH) 0.9 %
10 SYRINGE (ML) INJECTION
Status: COMPLETED | OUTPATIENT
Start: 2018-09-04 | End: 2018-09-04

## 2018-09-04 RX ORDER — SODIUM CHLORIDE 0.9 % (FLUSH) 0.9 %
30 SYRINGE (ML) INJECTION
Status: COMPLETED | OUTPATIENT
Start: 2018-09-04 | End: 2018-09-04

## 2018-09-04 RX ORDER — SODIUM CHLORIDE 9 MG/ML
250 INJECTION, SOLUTION INTRAVENOUS AS NEEDED
Status: DISCONTINUED | OUTPATIENT
Start: 2018-09-04 | End: 2018-09-08 | Stop reason: HOSPADM

## 2018-09-04 RX ORDER — ASPIRIN 81 MG/1
81 TABLET ORAL DAILY
Status: DISCONTINUED | OUTPATIENT
Start: 2018-09-04 | End: 2018-09-04

## 2018-09-04 RX ORDER — KETOROLAC TROMETHAMINE 30 MG/ML
15 INJECTION, SOLUTION INTRAMUSCULAR; INTRAVENOUS
Status: DISCONTINUED | OUTPATIENT
Start: 2018-09-04 | End: 2018-09-08 | Stop reason: HOSPADM

## 2018-09-04 RX ADMIN — IOPAMIDOL 100 ML: 755 INJECTION, SOLUTION INTRAVENOUS at 05:06

## 2018-09-04 RX ADMIN — Medication 10 ML: at 05:06

## 2018-09-04 RX ADMIN — ACETAMINOPHEN 1000 MG: 10 INJECTION, SOLUTION INTRAVENOUS at 13:24

## 2018-09-04 RX ADMIN — Medication 30 ML: at 11:04

## 2018-09-04 RX ADMIN — FENTANYL CITRATE 25 MCG: 50 INJECTION, SOLUTION INTRAMUSCULAR; INTRAVENOUS at 01:47

## 2018-09-04 RX ADMIN — FENTANYL CITRATE 25 MCG: 50 INJECTION, SOLUTION INTRAMUSCULAR; INTRAVENOUS at 07:42

## 2018-09-04 RX ADMIN — GLYCERIN 1 SUPPOSITORY: 2 SUPPOSITORY RECTAL at 19:29

## 2018-09-04 RX ADMIN — Medication 10 ML: at 16:07

## 2018-09-04 RX ADMIN — Medication 10 ML: at 09:22

## 2018-09-04 RX ADMIN — KETOROLAC TROMETHAMINE 15 MG: 30 INJECTION, SOLUTION INTRAMUSCULAR; INTRAVENOUS at 14:33

## 2018-09-04 RX ADMIN — SODIUM CHLORIDE 100 ML: 900 INJECTION, SOLUTION INTRAVENOUS at 09:22

## 2018-09-04 RX ADMIN — IOPAMIDOL 80 ML: 755 INJECTION, SOLUTION INTRAVENOUS at 09:22

## 2018-09-04 RX ADMIN — SODIUM CHLORIDE 125 ML/HR: 900 INJECTION, SOLUTION INTRAVENOUS at 01:55

## 2018-09-04 RX ADMIN — ACETAMINOPHEN 1000 MG: 10 INJECTION, SOLUTION INTRAVENOUS at 19:21

## 2018-09-04 RX ADMIN — SODIUM CHLORIDE 100 ML: 900 INJECTION, SOLUTION INTRAVENOUS at 05:06

## 2018-09-04 RX ADMIN — KETOROLAC TROMETHAMINE 15 MG: 30 INJECTION, SOLUTION INTRAMUSCULAR; INTRAVENOUS at 08:55

## 2018-09-04 RX ADMIN — Medication 10 ML: at 05:11

## 2018-09-04 NOTE — PROGRESS NOTES
vss af reports some SOB and back pain, abd pain. Having mult stools.  hgb low. Not confused now. IMP ileus, mainly colon, fentanyl will make this worse. RO PE. Transfuse. CT chest. Glyc sup. Leonel hose.

## 2018-09-04 NOTE — PROGRESS NOTES
Bedside shift change report given to [address] [place] [address] Mariel (oncoming nurse) by Altaf Salguero (offgoing nurse). Report included the following information SBAR, Kardex, ED Summary, Procedure Summary, Intake/Output, MAR and Recent Results.

## 2018-09-04 NOTE — PROGRESS NOTES
Hospitalist Progress Note Gissel Sawyer MD 
Answering service: [phone] OR [phone] from in house phone Date of Service:  2018 NAME:  Luigi Lee :  1945 MRN:  [id number] Admission Summary:  
75 y/o male with bladder cancer s/p radical cystoprostatectomy, urethrectomy, extended bilateral pelvic lymphadenectomy, ileal conduit and urinary diversion for bladder cancer on 2018. Presented with abdominal pain, distention, no BM since surgery. CT reported Fluid and stool filled distended colon. Status post cystectomy with prominent hematoma in the bladder resection bed. Patient admitted for possible post OP ileus. Kept NPO, started on IVF and urology consulted. Interval history / Subjective:  
 Overnight patient developed severe abdominal pain radiating to the back and later on c/o blurred vision and became incoherent. Code stroke called, no localizing focal deficit detected at the time. Had CTA head-normal. 
Patient seen and examined; family members (Wife and son) in the room. States he feels better now, still has moderate abdominal pain, mild SOB. Denies chest pain, fever/chills. Passing gas and had multiple BMs/watery since yesterday afternoon. Assessment & Plan: #. Post OP ileus; patient with recent dx of bladder cancer s/p radical cystoprostatectomy, urethrectomy, extended bilateral pelvic lymphadenectomy, ileal conduit and urinary diversion for bladder cancer on 2018.  
-Now having BM, started on clear liquid diet, advance as tolerated. -In view of SOB and pt had severe  pain radiating to the back, suspected PE, CTA chest with no PE, but reported 5 mm pulmonary nodule right middle lobe, follow-up recommended in one year. . LE doppler negative for DVT. -Encourage incentive spirometer use 
-Urology following #.  Episode of being incoherent/confusion and blurred vision: resolved now, unclear cause, stoke w/u initiated. CTA head negative, carotid doppler with no hemodynamically significant stenosis. Doubt stroke at this time, no focal neurologic deficit appreciated. MRI brain pending. TTE normal report. #. Anemia; acute, related to blood loss in view of recent surgery and CT abd/pelvis  demonstrated prominent hematoma in the bladder resection bed. 
-No active bleeding at this time; if h/h continues to drop despite transfusion and abdominal pain persist consider CT abdomen to r/o worsening hematoma in the bladder resection bed or  retroperitoneal hematoma. -Transfuse pRBC, monitor H/H. #. Leukocytosis: likely reactive, stress induced. -WBC down trending #. HTN: stable, monitor BP Code status: full DVT prophylaxis: SCD Care Plan discussed with: Patient/Family and Nurse Disposition: TBD Hospital Problems  Date Reviewed: 9/3/2018 Codes Class Noted POA * (Principal)Acute hyponatremia ICD-10-CM: E87.1 ICD-9-CM: 276.1  9/3/2018 Unknown Leukocytosis ICD-10-CM: N29.565 ICD-9-CM: 288.60  9/3/2018 Unknown Anemia ICD-10-CM: D64.9 ICD-9-CM: 285.9  9/3/2018 Unknown Generalized abdominal pain ICD-10-CM: R10.84 ICD-9-CM: 789.07  9/3/2018 Unknown Review of Systems: A comprehensive review of systems was negative except for that written in the HPI. Vital Signs:  
 Last 24hrs VS reviewed since prior progress note. Most recent are: 
Visit Vitals  /67  Pulse 88  Temp 98.3 °F (36.8 °C)  Resp 16  
 Ht 5' 8\" (1.727 m)  Wt 78.4 kg (172 lb 12.8 oz)  SpO2 95%  BMI 26.27 kg/m2 Intake/Output Summary (Last 24 hours) at 09/04/18 1218 Last data filed at 09/04/18 7373 Gross per 24 hour Intake             3798 ml Output             2150 ml Net             1648 ml Physical Examination:  
 
 
     
Constitutional:  No acute distress, cooperative, pleasant   
 ENT:  Oral mucous moist, oropharynx benign. Neck supple, Resp:  bibasilar crackles, no wheezing CV:  Regular rhythm, normal rate, no murmurs, gallops, rubs GI:  Soft, appropriately tender, +BS, ileal conduit ostomy RLQ-clear urine. Musculoskeletal:  No edema, warm, 2+ pulses throughout Neurologic:  Moves all extremities. AAOx3, CN II-XII reviewed Psych:  Good insight, Not anxious nor agitated. Data Review:  
 Review and/or order of clinical lab test 
Review and/or order of tests in the radiology section of [facility] Labs:  
 
Recent Labs  
   09/04/18 0157 09/03/18 
 0005 WBC  11.0  14.6* HGB  6.8*  8.6* HCT  21.2*  25.9*  
PLT  360  399 Recent Labs  
   09/04/18 
 0157  09/03/18 
 0005 NA  134*  130*  
K  4.4  4.6 CL  101  95* CO2  24  23 BUN  14  15 CREA  0.92  0.98  
GLU  90  143* CA  7.2*  8.4* Recent Labs  
   09/03/18 
 0005 SGOT  21 ALT  32  
AP  53 TBILI  0.7 TP  6.5 ALB  2.6*  
GLOB  3.9 Recent Labs  
   09/04/18 
 3179 INR  1.1 PTP  11.4* No results for input(s): FE, TIBC, PSAT, FERR in the last 72 hours. No results found for: FOL, RBCF No results for input(s): PH, PCO2, PO2 in the last 72 hours. No results for input(s): CPK, CKNDX, TROIQ in the last 72 hours. No lab exists for component: CPKMB No results found for: CHOL, CHOLX, CHLST, CHOLV, HDL, LDL, LDLC, DLDLP, TGLX, TRIGL, TRIGP, CHHD, CHHDX Lab Results Component Value Date/Time Glucose (POC) 97 09/04/2018 03:58 AM  
 
Lab Results Component Value Date/Time  Color YELLOW/STRAW 09/03/2018 04:27 AM  
 Appearance CLEAR 09/03/2018 04:27 AM  
 Specific gravity 1.010 09/03/2018 04:27 AM  
 Specific gravity 1.012 08/21/2018 12:46 PM  
 pH (UA) 6.0 09/03/2018 04:27 AM  
 Protein 100 (A) 09/03/2018 04:27 AM  
 Glucose NEGATIVE  09/03/2018 04:27 AM  
 Ketone TRACE (A) 09/03/2018 04:27 AM  
 Bilirubin NEGATIVE  09/03/2018 04:27 AM  
 Urobilinogen 0.2 09/03/2018 04:27 AM  
 Nitrites NEGATIVE  09/03/2018 04:27 AM  
 Leukocyte Esterase MODERATE (A) 09/03/2018 04:27 AM  
 Epithelial cells FEW 09/03/2018 04:27 AM  
 Bacteria NEGATIVE  09/03/2018 04:27 AM  
 WBC 10-20 09/03/2018 04:27 AM  
 RBC 20-50 09/03/2018 04:27 AM  
 
 
 
Medications Reviewed:  
 
Current Facility-Administered Medications Medication Dose Route Frequency  glycerin (adult) suppository 1 Suppository  1 Suppository Rectal NOW  
 0.9% sodium chloride infusion 250 mL  250 mL IntraVENous PRN  
 acetaminophen (OFIRMEV) infusion 1,000 mg  1,000 mg IntraVENous Q6H  
 ketorolac (TORADOL) injection 15 mg  15 mg IntraVENous Q6H PRN  
 0.9% sodium chloride infusion  125 mL/hr IntraVENous CONTINUOUS  
 sodium chloride (NS) flush 5-10 mL  5-10 mL IntraVENous Q8H  
 sodium chloride (NS) flush 5-10 mL  5-10 mL IntraVENous PRN  
 ondansetron (ZOFRAN) injection 4 mg  4 mg IntraVENous Q4H PRN  
 
______________________________________________________________________ EXPECTED LENGTH OF STAY: 3d 9h 
ACTUAL LENGTH OF STAY:          1 Asif Gallardo MD

## 2018-09-04 NOTE — CDMP QUERY
Please clarify if this patient is (was) being treated/managed for:  
 
=> Acute posthemorrhagic anemia 2/2 hematoma in the setting of drop in HGB requiring blood transfusion and lab monitoring 
=> Other explanation of clinical findings 
=> Clinically Undetermined (no explanation for clinical findings) The medical record reflects the following:  
 
Risk Factors:  s/p Radical cystoprostatectomy on 08/27. No BM since surgery and increased abd pain Clinical Indicators:  Per H & P & CT abs/pelvis:  Hematoma noted in bladder resection bed status post surgery w/increased abd pain. HGB dropped to 6.8 from 8.6 day before. Treatment: Repeat CT, 1 unit RBCs, Monitor VS and labs Please clarify and document your clinical opinion in the progress notes and discharge summary including the definitive and/or presumptive diagnosis, (suspected or probable), related to the above clinical findings. Please include clinical findings supporting your diagnosis. Thank you, Herberth Puente RN, BSN, [facility], [address] [place] 
[phone]

## 2018-09-04 NOTE — PROGRESS NOTES
TRANSFER - IN REPORT: 
 
Verbal report received from Digna(name) on Kristi Momin  being received from (unit) for routine progression of care Report consisted of patients Situation, Background, Assessment and  
Recommendations(SBAR). Information from the following report(s) SBAR was reviewed with the receiving nurse. Opportunity for questions and clarification was provided. Assessment completed upon patients arrival to unit and care assumed.

## 2018-09-04 NOTE — PROGRESS NOTES
Bedside and Verbal shift change report given to Noreen Jones (oncoming nurse) by Wendie Verma (offgoing nurse). Report included the following information SBAR and Kardex. 900: orders written by Dr Lynn Burns for stat CT pt sent downstairs via transport 915: Attempted report to 5E waited on hold for approx 20 min for RN- aware they are extremely Busy just did not want pt to show up after CT w giving RN report 
 
1000:  Report called to [facility]

## 2018-09-04 NOTE — PROCEDURES
[facility]  *** FINAL REPORT ***    Name: Deb Bonilla  MRN: [id number]    Inpatient  : 24 Mar 1945  HIS Order #: [id number]  [id number] [facility] Visit #: [id number]  Date: 04 Sep 2018    TYPE OF TEST: Cerebrovascular Duplex    REASON FOR TEST  CVA    Right Carotid:-             Proximal               Mid                 Distal  cm/s  Systolic  Diastolic  Systolic  Diastolic  Systolic  Diastolic  CCA:     00.2                                      91.0  Bulb:  ECA:    127.0  ICA:     88.0      19.0      111.0      28.0      100.0      36.0  ICA/CCA:  1.0    ICA Stenosis:    Right Vertebral:-  Finding: Antegrade  Sys:       69.0  Keri:       22.0    Right Subclavian:    Left Carotid:-            Proximal                Mid                 Distal  cm/s  Systolic  Diastolic  Systolic  Diastolic  Systolic  Diastolic  CCA:    947.1                                     106.0  Bulb:  ECA:    129.0  ICA:     86.0      19.0       81.0      25.0      103.0      40.0  ICA/CCA:  0.8    ICA Stenosis:    Left Vertebral:-  Finding: Antegrade  Sys:       51.0  Keri:       17.0    Left Subclavian:    INTERPRETATION/FINDINGS  PROCEDURE:  Color duplex ultrasound imaging of extracranial  cerebrovascular arteries. FINDINGS:       Right:  Internal carotid velocity is within normal limits. There   is mild narrowing of the internal carotid flow channel on color  Doppler imaging and mixed density plaque on B-mode imaging, consistent   with less than 50 percent stenosis. The common and external carotid  arteries are patent and without evidence of hemodynamically  significant stenosis. Left:  Internal carotid velocity is within normal limits. There  is mild narrowing of the internal carotid flow channel on color  Doppler imaging and mixed density plaque on B-mode imaging, consistent   with less than 50 percent stenosis.   The common and external carotid  arteries are patent and without evidence of hemodynamically  significant stenosis. IMPRESSION:  Consistent with mild, less than 50 percent stenosis of  the right and left internal carotid artery. Vertebrals are patent  with antegrade flow. ADDITIONAL COMMENTS    I have personally reviewed the data relevant to the interpretation of  this  study.     TECHNOLOGIST: Dandy Valencia RVT  Signed: 09/04/2018 10:51 AM    PHYSICIAN: Momo Treviño MD  Signed: 09/05/2018 07:19 AM

## 2018-09-04 NOTE — PROGRESS NOTES
0405: Code S called. Last time known well 0330. Blood glucose 97. Stat labs ordered and drawn. NIHSS = 11. 
 
 
 09/04/18 0405 NIH Stroke Scale Interval Other (comment) 
(CODE S)  
LOC 0  
LOC Questions 2 LOC Commands 2 Best Gaze 0 Visual 0 Facial Palsy 0 Motor Right Arm 0 Motor Left Arm 0 Motor Right Leg 3 Motor Left Leg 3 Limb Ataxia 0 Sensory 0 Best Language 1 Dysarthria 0 Extinction and Inattention 0 Total 11  
 
0425: Pt in preparation for STAT head CTA. Consult for tele-neurology called.

## 2018-09-04 NOTE — PROCEDURES
[facility]  *** FINAL REPORT ***    Name: Bhaskar Molina  MRN: [id number]    Inpatient  : 24 Mar 1945  HIS Order #: [id number]  [address] [facility] Visit #: [id number]  Date: 04 Sep 2018    TYPE OF TEST: Peripheral Venous Testing    REASON FOR TEST  Elevated d-dimer    Right Leg:-  Deep venous thrombosis:           No  Superficial venous thrombosis:    No  Deep venous insufficiency:        Not examined  Superficial venous insufficiency: Not examined    Left Leg:-  Deep venous thrombosis:           No  Superficial venous thrombosis:    No  Deep venous insufficiency:        Not examined  Superficial venous insufficiency: Not examined      INTERPRETATION/FINDINGS  PROCEDURE:  Color duplex ultrasound imaging of lower extremity veins. FINDINGS:       Right: The common femoral, deep femoral, femoral, popliteal,  posterior tibial, peroneal, and great saphenous are patent and without   evidence of thrombus;  each is fully compressible and there is no  narrowing of the flow channel on color Doppler imaging. Phasic flow  is observed in the common femoral vein. Left:   The common femoral, deep femoral, femoral, popliteal,  posterior tibial, peroneal, and great saphenous are patent and without   evidence of thrombus;  each is fully compressible and there is no  narrowing of the flow channel on color Doppler imaging. Phasic flow  is observed in the common femoral vein. IMPRESSION:  No evidence of right or left lower extremity vein  thrombosis. ADDITIONAL COMMENTS    I have personally reviewed the data relevant to the interpretation of  this  study.     TECHNOLOGIST: Joselin Meyers RVT  Signed: 2018 10:52 AM    PHYSICIAN: Tanya Myles MD  Signed: 2018 07:19 AM

## 2018-09-04 NOTE — PROGRESS NOTES
0350 RRT called: Pt complained of blurred vision and acute confusion. Code stroke initiated. NIH 11. Pt is not following commands, he responds verbally, but responses are inappropriate. He is complaining of abdominal pain Pt seen and evaluated. VS:  T  97.6  /74 HR90   RR  24 O2sat 97 PE: 
GEN-mod distress PSYCH-A&Ox1 [place] 32 11; no commands PERRL HEENT-NCAT/pupils 2 mm reactive bilateral. Oropharynx clear. NECK-supple, trachea midline LUNGS-CTA B 
HEART-RRR 
ABD-soft,ND,  tender SKIN-warm/dry Recent Results (from the past 8 hour(s)) CBC WITH AUTOMATED DIFF Collection Time: 09/04/18  1:57 AM  
Result Value Ref Range WBC 11.0 4.1 - 11.1 K/uL  
 RBC 2.27 (L) 4.10 - 5.70 M/uL HGB 6.8 (L) 12.1 - 17.0 g/dL HCT 21.2 (L) 36.6 - 50.3 % MCV 93.4 80.0 - 99.0 FL  
 MCH 30.0 26.0 - 34.0 PG  
 MCHC 32.1 30.0 - 36.5 g/dL  
 RDW 14.7 (H) 11.5 - 14.5 % PLATELET 415 587 - 773 K/uL MPV 9.8 8.9 - 12.9 FL  
 NRBC 0.0 0  WBC ABSOLUTE NRBC 0.00 0.00 - 0.01 K/uL NEUTROPHILS 71 32 - 75 % LYMPHOCYTES 13 12 - 49 % MONOCYTES 12 5 - 13 % EOSINOPHILS 3 0 - 7 % BASOPHILS 0 0 - 1 % IMMATURE GRANULOCYTES 1 (H) 0.0 - 0.5 % ABS. NEUTROPHILS 7.9 1.8 - 8.0 K/UL  
 ABS. LYMPHOCYTES 1.4 0.8 - 3.5 K/UL  
 ABS. MONOCYTES 1.3 (H) 0.0 - 1.0 K/UL  
 ABS. EOSINOPHILS 0.3 0.0 - 0.4 K/UL  
 ABS. BASOPHILS 0.0 0.0 - 0.1 K/UL  
 ABS. IMM. GRANS. 0.1 (H) 0.00 - 0.04 K/UL  
 DF SMEAR SCANNED    
 RBC COMMENTS ANISOCYTOSIS 1+ 
    
 RBC COMMENTS POLYCHROMASIA 1+ 
    
 RBC COMMENTS BASOPHILIC STIPPLING 
PRESENT 
    
METABOLIC PANEL, BASIC Collection Time: 09/04/18  1:57 AM  
Result Value Ref Range Sodium 134 (L) 136 - 145 mmol/L Potassium 4.4 3.5 - 5.1 mmol/L Chloride 101 97 - 108 mmol/L  
 CO2 24 21 - 32 mmol/L Anion gap 9 5 - 15 mmol/L Glucose 90 65 - 100 mg/dL BUN 14 6 - 20 MG/DL  Creatinine 0.92 0.70 - 1.30 MG/DL  
 BUN/Creatinine ratio 15 12 - 20    
 GFR est AA >60 >60 ml/min/1.73m2 GFR est non-AA >60 >60 ml/min/1.73m2 Calcium 7.2 (L) 8.5 - 10.1 MG/DL  
ECG RHYTHM ANALYSIS ADULT Collection Time: 09/04/18  3:53 AM  
Result Value Ref Range Ventricular Rate 96 BPM  
 Atrial Rate 96 BPM  
 P-R Interval 150 ms QRS Duration 76 ms  
 Q-T Interval 370 ms QTC Calculation (Bezet) 467 ms Calculated P Axis 24 degrees Calculated R Axis 14 degrees Calculated T Axis 6 degrees Diagnosis Normal sinus rhythm ST abnormality, possible digitalis effect When compared with ECG of 01-AUG-2018 10:26, 
Nonspecific T wave abnormality no longer evident in Anterior leads QT has lengthened GLUCOSE, POC Collection Time: 09/04/18  3:58 AM  
Result Value Ref Range Glucose (POC) 97 65 - 100 mg/dL Performed by [address] Collection Time: 09/04/18  4:12 AM  
Result Value Ref Range Crossmatch Expiration 09/07/2018 ABO/Rh(D) O POSITIVE Antibody screen NEG   
D DIMER Collection Time: 09/04/18  4:12 AM  
Result Value Ref Range D-dimer 5.94 (H) 0.00 - 0.65 mg/L FEU PROTHROMBIN TIME + INR Collection Time: 09/04/18  4:12 AM  
Result Value Ref Range INR 1.1 0.9 - 1.1 Prothrombin time 11.4 (H) 9.0 - 11.1 sec LACTIC ACID Collection Time: 09/04/18  4:12 AM  
Result Value Ref Range Lactic acid 1.8 0.4 - 2.0 MMOL/L  
POC G3 - PUL Collection Time: 09/04/18  4:24 AM  
Result Value Ref Range pH (POC) 7.616 (HH) 7.35 - 7.45    
 pCO2 (POC) 20.7 (L) 35.0 - 45.0 MMHG  
 pO2 (POC) 145 (H) 80 - 100 MMHG  
 HCO3 (POC) 21.1 (L) 22 - 26 MMOL/L  
 sO2 (POC) 100 (H) 92 - 97 % Base excess (POC) 0 mmol/L Site RIGHT RADIAL Device: NASAL CANNULA Flow rate (POC) 4 L/M Allens test (POC) YES Specimen type (POC) ARTERIAL Total resp. rate 22 A/P: 
-AMS/visual disturbance Spoke with Tele neuro Dr. Edvin Fields. -Recs CT and CTA. Call with results -Pt not candidate for TPA given recent surgical procedures CTA shows: No flow-limiting stenosis or vascular occlusion. No aneurysm. No neck mass. Centrilobular emphysema is partially imaged. Cervical spine degenerative disc disease. CT shows: No acute abnormality 5180 Results called to Tele neuro Dr. Angela Leyva. Recs: Stroke workup And Aspirin. MRI, Echo and carotid duplex ordered. 
 
-Nursing staff called elevated d-dimer 
-May 2/2 malignancy or recent surgeries. Will get venous duplex

## 2018-09-05 LAB
ANION GAP SERPL CALC-SCNC: 8 MMOL/L (ref 5–15)
BUN SERPL-MCNC: 10 MG/DL (ref 6–20)
BUN/CREAT SERPL: 11 (ref 12–20)
CALCIUM SERPL-MCNC: 7.4 MG/DL (ref 8.5–10.1)
CHLORIDE SERPL-SCNC: 101 MMOL/L (ref 97–108)
CO2 SERPL-SCNC: 25 MMOL/L (ref 21–32)
CREAT SERPL-MCNC: 0.87 MG/DL (ref 0.7–1.3)
ERYTHROCYTE [DISTWIDTH] IN BLOOD BY AUTOMATED COUNT: 14.8 % (ref 11.5–14.5)
GLUCOSE SERPL-MCNC: 95 MG/DL (ref 65–100)
HCT VFR BLD AUTO: 25.1 % (ref 36.6–50.3)
HGB BLD-MCNC: 8.1 G/DL (ref 12.1–17)
MCH RBC QN AUTO: 29.8 PG (ref 26–34)
MCHC RBC AUTO-ENTMCNC: 32.3 G/DL (ref 30–36.5)
MCV RBC AUTO: 92.3 FL (ref 80–99)
NRBC # BLD: 0 K/UL (ref 0–0.01)
NRBC BLD-RTO: 0 PER 100 WBC
PLATELET # BLD AUTO: 360 K/UL (ref 150–400)
PMV BLD AUTO: 9.4 FL (ref 8.9–12.9)
POTASSIUM SERPL-SCNC: 4 MMOL/L (ref 3.5–5.1)
RBC # BLD AUTO: 2.72 M/UL (ref 4.1–5.7)
SODIUM SERPL-SCNC: 134 MMOL/L (ref 136–145)
WBC # BLD AUTO: 8.2 K/UL (ref 4.1–11.1)

## 2018-09-05 PROCEDURE — 74011250637 HC RX REV CODE- 250/637: Performed by: INTERNAL MEDICINE

## 2018-09-05 PROCEDURE — 85027 COMPLETE CBC AUTOMATED: CPT | Performed by: INTERNAL MEDICINE

## 2018-09-05 PROCEDURE — 74011250636 HC RX REV CODE- 250/636: Performed by: UROLOGY

## 2018-09-05 PROCEDURE — 36415 COLL VENOUS BLD VENIPUNCTURE: CPT | Performed by: INTERNAL MEDICINE

## 2018-09-05 PROCEDURE — 74011250636 HC RX REV CODE- 250/636: Performed by: FAMILY MEDICINE

## 2018-09-05 PROCEDURE — 65270000029 HC RM PRIVATE

## 2018-09-05 PROCEDURE — 80048 BASIC METABOLIC PNL TOTAL CA: CPT | Performed by: INTERNAL MEDICINE

## 2018-09-05 PROCEDURE — 74011250637 HC RX REV CODE- 250/637: Performed by: UROLOGY

## 2018-09-05 PROCEDURE — 74011250636 HC RX REV CODE- 250/636: Performed by: INTERNAL MEDICINE

## 2018-09-05 RX ORDER — LISINOPRIL 10 MG/1
10 TABLET ORAL
Status: DISCONTINUED | OUTPATIENT
Start: 2018-09-05 | End: 2018-09-08 | Stop reason: HOSPADM

## 2018-09-05 RX ORDER — GLYCERIN ADULT
1 SUPPOSITORY, RECTAL RECTAL
Status: COMPLETED | OUTPATIENT
Start: 2018-09-05 | End: 2018-09-05

## 2018-09-05 RX ORDER — ASCORBIC ACID 500 MG
500 TABLET ORAL DAILY
Status: DISCONTINUED | OUTPATIENT
Start: 2018-09-05 | End: 2018-09-08 | Stop reason: HOSPADM

## 2018-09-05 RX ORDER — ATORVASTATIN CALCIUM 20 MG/1
20 TABLET, FILM COATED ORAL
Status: DISCONTINUED | OUTPATIENT
Start: 2018-09-05 | End: 2018-09-08 | Stop reason: HOSPADM

## 2018-09-05 RX ORDER — LANOLIN ALCOHOL/MO/W.PET/CERES
1 CREAM (GRAM) TOPICAL
Status: DISCONTINUED | OUTPATIENT
Start: 2018-09-05 | End: 2018-09-08 | Stop reason: HOSPADM

## 2018-09-05 RX ADMIN — ONDANSETRON 4 MG: 2 INJECTION INTRAMUSCULAR; INTRAVENOUS at 23:19

## 2018-09-05 RX ADMIN — KETOROLAC TROMETHAMINE 15 MG: 30 INJECTION, SOLUTION INTRAMUSCULAR; INTRAVENOUS at 23:19

## 2018-09-05 RX ADMIN — GLYCERIN 1 SUPPOSITORY: 2 SUPPOSITORY RECTAL at 10:47

## 2018-09-05 RX ADMIN — ACETAMINOPHEN 1000 MG: 10 INJECTION, SOLUTION INTRAVENOUS at 07:06

## 2018-09-05 RX ADMIN — SODIUM CHLORIDE 100 ML/HR: 900 INJECTION, SOLUTION INTRAVENOUS at 04:40

## 2018-09-05 RX ADMIN — ACETAMINOPHEN 1000 MG: 10 INJECTION, SOLUTION INTRAVENOUS at 01:53

## 2018-09-05 RX ADMIN — Medication 10 ML: at 23:20

## 2018-09-05 RX ADMIN — Medication 10 ML: at 07:06

## 2018-09-05 RX ADMIN — LISINOPRIL 10 MG: 10 TABLET ORAL at 23:19

## 2018-09-05 RX ADMIN — OXYCODONE HYDROCHLORIDE AND ACETAMINOPHEN 500 MG: 500 TABLET ORAL at 09:19

## 2018-09-05 RX ADMIN — FERROUS SULFATE TAB 325 MG (65 MG ELEMENTAL FE) 325 MG: 325 (65 FE) TAB at 09:19

## 2018-09-05 RX ADMIN — ATORVASTATIN CALCIUM 20 MG: 20 TABLET, FILM COATED ORAL at 23:19

## 2018-09-05 NOTE — PROGRESS NOTES
Bedside and Verbal shift change report given to Garrett Pedraza RN (oncoming nurse) by Roel Fisher RN (offgoing nurse). Report included the following information SBAR, Kardex, ED Summary, Procedure Summary, Intake/Output, MAR, Accordion and Recent Results.

## 2018-09-05 NOTE — PROGRESS NOTES
Spiritual Care Partner Volunteer visited patient in room 512/01 on 9.05.18. Documented by: : Rev. Nelson Adkins. Dajuan Victor; [facility], to contact [phone] Terrence Alejandra call: [phone]

## 2018-09-05 NOTE — PROGRESS NOTES
Hospitalist Progress Note Shawanda Lopez MD 
Answering service: [phone] OR [phone] from in house phone Date of Service:  2018 NAME:  Heather Lassiter :  1945 MRN:  [id number] Admission Summary:  
73 y/o male with bladder cancer s/p radical cystoprostatectomy, urethrectomy, extended bilateral pelvic lymphadenectomy, ileal conduit and urinary diversion for bladder cancer on 2018. Presented with abdominal pain, distention, no BM since surgery. CT reported Fluid and stool filled distended colon. Status post cystectomy with prominent hematoma in the bladder resection bed. Patient admitted for possible post OP ileus. Kept NPO, started on IVF and urology consulted. Interval history / Subjective:  
 Patient seen and examined; spouse in the room. States he feels better, still has mild abdominal pain/ like gas pain, but controlled with current pain meds. Was walking in hallway this morning, denies any SOB, cough, chest pain, dizziness or lightheadedness. Tolerated clear liquid diet, no nausea/vomiting. Passing gas and had BM last night. Assessment & Plan: #. Post OP ileus; patient with recent dx of bladder cancer s/p radical cystoprostatectomy, urethrectomy, extended bilateral pelvic lymphadenectomy, ileal conduit and urinary diversion for bladder cancer on 2018.  
-Now having BM, started on clear liquid diet, advance as tolerated per urology rec 
-Urology following #. SOB, resolved - CTA chest with no PE, but reported 5 mm pulmonary nodule right middle lobe, follow-up recommended in one year. LE doppler negative for DVT. -Encourage incentive spirometer use 
-Wean off O2 supplement #. Episode of being incoherent/confusion and blurred vision: resolved now, unclear cause, stoke w/u initiated. CTA head negative, carotid doppler with no hemodynamically significant stenosis. MRI brain negative.  TTE normal report. #. Anemia; acute, related to blood loss in view of recent surgery and CT abd/pelvis  demonstrated prominent hematoma in the bladder resection bed. 
-No active bleeding at this time;  
-Transfused one unit pRBC yesterday for Hb6.8, responded appropriately, now 8.1 
-Monitor H/H 
-Resume iron supplement. #. Leukocytosis: likely reactive, stress induced. -WBC down trending #. HTN: stable, Resume home meds Code status: full DVT prophylaxis: SCD Care Plan discussed with: Patient/Family and Nurse Disposition: TBD Hospital Problems  Date Reviewed: 9/3/2018 Codes Class Noted POA * (Principal)Acute hyponatremia ICD-10-CM: E87.1 ICD-9-CM: 276.1  9/3/2018 Unknown Leukocytosis ICD-10-CM: S80.560 ICD-9-CM: 288.60  9/3/2018 Unknown Anemia ICD-10-CM: D64.9 ICD-9-CM: 285.9  9/3/2018 Unknown Generalized abdominal pain ICD-10-CM: R10.84 ICD-9-CM: 789.07  9/3/2018 Unknown Review of Systems: A comprehensive review of systems was negative except for that written in the HPI. Vital Signs:  
 Last 24hrs VS reviewed since prior progress note. Most recent are: 
Visit Vitals  /77 (BP 1 Location: Right arm)  Pulse 80  Temp 97.7 °F (36.5 °C)  Resp 16  
 Ht 5' 8\" (1.727 m)  Wt 78.4 kg (172 lb 12.8 oz)  SpO2 95%  BMI 26.27 kg/m2 Intake/Output Summary (Last 24 hours) at 09/05/18 1712 Last data filed at 09/05/18 3177 Gross per 24 hour Intake            286.3 ml Output             1650 ml Net          -1363.7 ml Physical Examination:  
 
 
     
Constitutional:  No acute distress, cooperative, pleasant   
ENT:  Oral mucous moist, oropharynx benign. Neck supple, Resp:  bibasilar crackles, no wheezing CV:  Regular rhythm, normal rate, no murmurs, gallops, rubs GI:  Soft, appropriately tender, +BS, ileal conduit ostomy RLQ-clear urine. Musculoskeletal:  No edema, warm, 2+ pulses throughout Neurologic:  Moves all extremities. AAOx3, CN II-XII reviewed Psych:  Good insight, Not anxious nor agitated. Data Review:  
 Review and/or order of clinical lab test 
Review and/or order of tests in the radiology section of [facility]:  
 
Recent Labs  
   09/05/18 0208  09/04/18 
 0157 WBC  8.2  11.0 HGB  8.1*  6.8* HCT  25.1*  21.2*  
PLT  360  360 Recent Labs  
   09/05/18 
 0208  09/04/18 
 0157  09/03/18 
 0005 NA  134*  134*  130*  
K  4.0  4.4  4.6 CL  101  101  95* CO2  25  24  23 BUN  10  14  15 CREA  0.87  0.92  0.98  
GLU  95  90  143* CA  7.4*  7.2*  8.4* Recent Labs  
   09/03/18 
 0005 SGOT  21 ALT  32  
AP  53 TBILI  0.7 TP  6.5 ALB  2.6*  
GLOB  3.9 Recent Labs  
   09/04/18 
 0523 INR  1.1 PTP  11.4* No results for input(s): FE, TIBC, PSAT, FERR in the last 72 hours. No results found for: FOL, RBCF No results for input(s): PH, PCO2, PO2 in the last 72 hours. No results for input(s): CPK, CKNDX, TROIQ in the last 72 hours. No lab exists for component: CPKMB No results found for: CHOL, CHOLX, CHLST, CHOLV, HDL, LDL, LDLC, DLDLP, TGLX, TRIGL, TRIGP, CHHD, CHHDX Lab Results Component Value Date/Time Glucose (POC) 97 09/04/2018 03:58 AM  
 
Lab Results Component Value Date/Time  Color YELLOW/STRAW 09/03/2018 04:27 AM  
 Appearance CLEAR 09/03/2018 04:27 AM  
 Specific gravity 1.010 09/03/2018 04:27 AM  
 Specific gravity 1.012 08/21/2018 12:46 PM  
 pH (UA) 6.0 09/03/2018 04:27 AM  
 Protein 100 (A) 09/03/2018 04:27 AM  
 Glucose NEGATIVE  09/03/2018 04:27 AM  
 Ketone TRACE (A) 09/03/2018 04:27 AM  
 Bilirubin NEGATIVE  09/03/2018 04:27 AM  
 Urobilinogen 0.2 09/03/2018 04:27 AM  
 Nitrites NEGATIVE  09/03/2018 04:27 AM  
 Leukocyte Esterase MODERATE (A) 09/03/2018 04:27 AM  
 Epithelial cells FEW 09/03/2018 04:27 AM  
 Bacteria NEGATIVE  09/03/2018 04:27 AM  
 WBC 10-20 09/03/2018 04:27 AM  
 RBC 20-50 09/03/2018 04:27 AM  
 
 
 
Medications Reviewed:  
 
Current Facility-Administered Medications Medication Dose Route Frequency  0.9% sodium chloride infusion 250 mL  250 mL IntraVENous PRN  
 ketorolac (TORADOL) injection 15 mg  15 mg IntraVENous Q6H PRN  
 0.9% sodium chloride infusion  100 mL/hr IntraVENous CONTINUOUS  
 sodium chloride (NS) flush 5-10 mL  5-10 mL IntraVENous Q8H  
 sodium chloride (NS) flush 5-10 mL  5-10 mL IntraVENous PRN  
 ondansetron (ZOFRAN) injection 4 mg  4 mg IntraVENous Q4H PRN  
 
______________________________________________________________________ EXPECTED LENGTH OF STAY: 3d 9h 
ACTUAL LENGTH OF STAY:          2 Jerardo Campbell MD

## 2018-09-05 NOTE — PROGRESS NOTES
Primary Nurse Matt Whaley and Karli Reddy, RN performed a dual skin assessment on this patient No impairment noted Vinnie score is 19

## 2018-09-05 NOTE — PROGRESS NOTES
Bedside shift change report given to Ld Roth RN (oncoming nurse) by Adelina Swain RN (offgoing nurse). Report included the following information SBAR, Intake/Output and MAR.

## 2018-09-05 NOTE — WOUND CARE
WOCN: 
 
Consult for : urostomy Family familiar to writer and did teaching when patient was in the hospital.  
 
Ostomy: removed urostomy pouch. Stoma measuring 1 1/8 / nicely budded / 2 white stents patent / onelia stomal skin is clear. Little mucus on stents and removed gently. [employer] 2 piece pouching system / cut wafer 1 1/8 / connected ostomy pouch to straight bag drainage. Family appreciated the review. Lorri MERRITT RN Wound Care Department Office: [phone] Pager: [phone]

## 2018-09-05 NOTE — PROGRESS NOTES
Bedside shift change report given to [employer] (oncoming nurse) by Vivek Moran (offgoing nurse). Report included the following information SBAR, Kardex and Recent Results.

## 2018-09-05 NOTE — PROGRESS NOTES
Care Management Interventions PCP Verified by CM:  Brice Craig MD) Mode of Transport at Discharge: Other (see comment) Transition of Care Consult (CM Consult): Other (None) MyChart Signup: No 
Discharge Durable Medical Equipment: No 
Physical Therapy Consult: No 
Occupational Therapy Consult: No 
Speech Therapy Consult: No 
Current Support Network: Lives with Spouse Confirm Follow Up Transport: Family Plan discussed with Pt/Family/Caregiver: Yes Freedom of Choice Offered:  (N/A) Discharge Location Discharge Placement: Home with family assistance Reason for Admission:   Post op ileus. History surgery for bladder cancer on 8/27/18 RRAT Score:          9 Plan for utilizing home health:  N/A Likelihood of Readmission:      Low Transition of Care Plan:                   
CM met with patient and his wife with whom he lives. They have one supportive son who lives locally. Patient reports good family /social support. Patient is ambulatory and expects return to independent functioning. Patient confirmed PCP, health insurance, and prescription coverage. Transport at discharge will be provided by wife. No discharge planning needs presented at this time.

## 2018-09-06 ENCOUNTER — APPOINTMENT (OUTPATIENT)
Dept: GENERAL RADIOLOGY | Age: 73
DRG: 389 | End: 2018-09-06
Attending: UROLOGY
Payer: MEDICARE

## 2018-09-06 LAB
ANION GAP SERPL CALC-SCNC: 10 MMOL/L (ref 5–15)
BUN SERPL-MCNC: 8 MG/DL (ref 6–20)
BUN/CREAT SERPL: 11 (ref 12–20)
CALCIUM SERPL-MCNC: 7.6 MG/DL (ref 8.5–10.1)
CHLORIDE SERPL-SCNC: 103 MMOL/L (ref 97–108)
CO2 SERPL-SCNC: 21 MMOL/L (ref 21–32)
CREAT SERPL-MCNC: 0.75 MG/DL (ref 0.7–1.3)
GLUCOSE SERPL-MCNC: 104 MG/DL (ref 65–100)
HGB BLD-MCNC: 7.8 G/DL (ref 12.1–17)
POTASSIUM SERPL-SCNC: 4 MMOL/L (ref 3.5–5.1)
SODIUM SERPL-SCNC: 134 MMOL/L (ref 136–145)

## 2018-09-06 PROCEDURE — 85018 HEMOGLOBIN: CPT | Performed by: UROLOGY

## 2018-09-06 PROCEDURE — 94760 N-INVAS EAR/PLS OXIMETRY 1: CPT

## 2018-09-06 PROCEDURE — 74011250636 HC RX REV CODE- 250/636: Performed by: FAMILY MEDICINE

## 2018-09-06 PROCEDURE — 80048 BASIC METABOLIC PNL TOTAL CA: CPT | Performed by: UROLOGY

## 2018-09-06 PROCEDURE — 74011250636 HC RX REV CODE- 250/636: Performed by: UROLOGY

## 2018-09-06 PROCEDURE — 74011250637 HC RX REV CODE- 250/637: Performed by: INTERNAL MEDICINE

## 2018-09-06 PROCEDURE — 65270000029 HC RM PRIVATE

## 2018-09-06 PROCEDURE — 74011250637 HC RX REV CODE- 250/637: Performed by: UROLOGY

## 2018-09-06 PROCEDURE — 74019 RADEX ABDOMEN 2 VIEWS: CPT

## 2018-09-06 PROCEDURE — 74011250636 HC RX REV CODE- 250/636: Performed by: INTERNAL MEDICINE

## 2018-09-06 PROCEDURE — 36415 COLL VENOUS BLD VENIPUNCTURE: CPT | Performed by: UROLOGY

## 2018-09-06 PROCEDURE — 77010033678 HC OXYGEN DAILY

## 2018-09-06 RX ORDER — GLYCERIN ADULT
1 SUPPOSITORY, RECTAL RECTAL
Status: COMPLETED | OUTPATIENT
Start: 2018-09-06 | End: 2018-09-06

## 2018-09-06 RX ADMIN — Medication 10 ML: at 22:08

## 2018-09-06 RX ADMIN — SODIUM CHLORIDE 100 ML/HR: 900 INJECTION, SOLUTION INTRAVENOUS at 13:26

## 2018-09-06 RX ADMIN — KETOROLAC TROMETHAMINE 15 MG: 30 INJECTION, SOLUTION INTRAMUSCULAR; INTRAVENOUS at 10:26

## 2018-09-06 RX ADMIN — Medication 10 ML: at 07:18

## 2018-09-06 RX ADMIN — LISINOPRIL 10 MG: 10 TABLET ORAL at 22:07

## 2018-09-06 RX ADMIN — OXYCODONE HYDROCHLORIDE AND ACETAMINOPHEN 500 MG: 500 TABLET ORAL at 09:26

## 2018-09-06 RX ADMIN — KETOROLAC TROMETHAMINE 15 MG: 30 INJECTION, SOLUTION INTRAMUSCULAR; INTRAVENOUS at 18:46

## 2018-09-06 RX ADMIN — SODIUM CHLORIDE 100 ML/HR: 900 INJECTION, SOLUTION INTRAVENOUS at 02:40

## 2018-09-06 RX ADMIN — ONDANSETRON 4 MG: 2 INJECTION INTRAMUSCULAR; INTRAVENOUS at 10:53

## 2018-09-06 RX ADMIN — FERROUS SULFATE TAB 325 MG (65 MG ELEMENTAL FE) 325 MG: 325 (65 FE) TAB at 07:18

## 2018-09-06 RX ADMIN — Medication 10 ML: at 10:53

## 2018-09-06 RX ADMIN — ATORVASTATIN CALCIUM 20 MG: 20 TABLET, FILM COATED ORAL at 22:07

## 2018-09-06 RX ADMIN — GLYCERIN 1 SUPPOSITORY: 2 SUPPOSITORY RECTAL at 12:09

## 2018-09-06 NOTE — PROGRESS NOTES
NUTRITION COMPLETE ASSESSMENT 
 
RECOMMENDATIONS:  
1. Pending results of KUB: 
 (A) Consider advancement of diet to Full Liquids + Ensure [place] (B) PICC placement with start of TPN (see recommendations below if needed) 2. At risk for refeeding syndrome: - Add daily MVI   
 - Add 100mg thiamine/day x 7 days - Check Phos, K+ and Mg 3. Daily weights Interventions/Plan:  
Food/Nutrient Delivery:    Commercial supplement (Ensure Clear TID -> Ensure Enlive)     Initiate parenteral nutrition (pending KUB results) Nutrition Education:    supplements/TPN Assessment:  
Reason for Assessment:[x]NPO/Clear Liquid /[x]At Nutrition Risk Diet: Clear liquids Supplements: Ensure Clear TID (added by RD this morning) Nutritionally Significant Medications: [x] Reviewed & Includes: vit C, iron, NS @ 100ml/hr; PRN: zofran Meal Intake:  
Patient Vitals for the past 100 hrs: 
 % Diet Eaten 09/03/18 1809 75 % Pre-Hospitalization: 
Usual Appetite: Poor Diet at Home: liquids Vitamins/Supplements: Yes (Ensure Enlive) Subjective: \"I drank of the Ensure clear this morning. I really haven't had much of anything since Friday/Saturday. \" 
 
Objective: 
Pt admitted for acute hyponatremia. PMHx: bladder CA s/p radical cystoprostatectomy with ileal conduit (8/27/18), GERD, HTNH. Post-op ileus noted with poor oral intake, nausea, and constipation since surgery. BM noted on 9/3 with clear liquid diet started. Still with nausea this morning. Plans for KUB to assess - results still pending. Pt and son visited today. He reports tolerating only liquids since surgery (drinking 2 Ensure/day as provided while inpatient). Minimal intake at home since discharge on Friday (6 days). Pending KUB recommend diet advancement to full liquids + Ensure Enlive TID (1050kcal, 60g protein) OR start of TPN. Discussed with pt. If TPN needed recommend: 
 Day 1: 5%AA, D20 @ 42ml/hr Day 2: 5%AA, D20 @ 83ml/hr GOAL: 5%AA, D20 @ 83ml/hr + 250ml, 20% lipids 3x/week Goal provides: 1874kcal, 100g protein, 400g CHO (GIR 3.54mg/kg/min), 2000ml fluid. Meets 100% energy and protein needs. At risk for refeeding syndrome so will need to closely monitor electrolytes with repletion PRN. Add 100mg thiamine/day x 7 days. Severe wt loss of 4% x ~1 week noted. Wt Readings from Last 10 Encounters:  
09/03/18 78.4 kg (172 lb 12.8 oz) 08/31/18 80.6 kg (177 lb 9.6 oz) 08/21/18 80.9 kg (178 lb 6 oz) 08/08/18 79.5 kg (175 lb 6 oz) 08/01/18 79.5 kg (175 lb 6 oz) Patient meets criteria for Severe Acute Protein Calorie Malnutrition as evidenced by:  
ASPEN Malnutrition Criteria Acute Illness, Chronic Illness, or Social/Enviornmental: Acute illness Energy Intake: Less than/equal to 50% est energy req for greater than/equal to 5 days Weight Loss: Greater than 1-2% x 1 wk ASPEN Malnutrition Score - Acute Illness: 12 Acute Illness - Malnutrition Diagnosis: Severe malnutrition. Will continue to follow for diet advancement/supplements vs TPN, wt trends, electrolytes. Estimated Nutrition Needs:  
Kcals/day: 3528 Kcals/day ([id number]) Protein: 94 g (1.2g/kg) Fluid: 1800 ml (1ml/kcal) Based On: [place] Jeor (x 1.2-1.3) Weight Used: Actual wt (78.4kg) Pt expected to meet estimated nutrient needs:  []   Yes     [x]  No - while on clear liquids [] Unable to predict at this time Nutrition Diagnosis:  
1. Inadequate protein-energy intake related to altered GI fx as evidenced by post-op ileus; poor PO intake x 10 days Goals:   
 Diet advancement to at least full liquids + Ensure in 24hrs or start of TPN Monitoring & Evaluation: - Enteral/parenteral nutrition intake, Total energy intake, Liquid meal replacement - Weight/weight change Previous Nutrition Goals Met:   N/A Previous Recommendations:    N/A Education & Discharge Needs: 
 [] None Identified 
 [x] Identified and addressed [x] Participated in care plan, discharge planning, and/or interdisciplinary rounds Cultural, Congregation and ethnic food preferences identified: None Skin Integrity: [x]Intact  []Other Edema: []None [x]Other: 1+ LLE Last BM: 9/6 - loose Food Allergies: [x]None []Other Diet Restrictions: Cultural/Jew Preference(s): None Anthropometrics:   
Weight Loss Metrics 9/3/2018 8/31/2018 8/27/2018 8/21/2018 8/8/2018 8/1/2018 Today's Wt 172 lb 12.8 oz 177 lb 9.6 oz - 178 lb 6 oz 175 lb 6 oz 175 lb 6 oz BMI 26.27 kg/m2 - 27 kg/m2 27.12 kg/m2 26.67 kg/m2 26.67 kg/m2 Weight Source: Standing scale (comment) Height: 5' 8\" (172.7 cm), Body mass index is 26.27 kg/(m^2). IBW : 69.9 kg (154 lb), % IBW (Calculated): 112.21 % Usual Body Weight: 80.7 kg (178 lb),   
 
Labs:   
Lab Results Component Value Date/Time Sodium 134 (L) 09/06/2018 02:49 AM  
 Potassium 4.0 09/06/2018 02:49 AM  
 Chloride 103 09/06/2018 02:49 AM  
 CO2 21 09/06/2018 02:49 AM  
 Glucose 104 (H) 09/06/2018 02:49 AM  
 BUN 8 09/06/2018 02:49 AM  
 Creatinine 0.75 09/06/2018 02:49 AM  
 Calcium 7.6 (L) 09/06/2018 02:49 AM  
 Magnesium 2.1 08/30/2018 03:45 AM  
 Phosphorus 3.1 08/30/2018 03:45 AM  
 Albumin 2.6 (L) 09/03/2018 12:05 AM  
 
Chanda Soriano RD Pager #[phone] or [phone]

## 2018-09-06 NOTE — PROGRESS NOTES
Bedside and Verbal shift change report given to Vamsi Emmanuel RN (oncoming nurse) by Jessika Bone RN (offgoing nurse). Report included the following information SBAR, Kardex, ED Summary, Procedure Summary, Intake/Output, MAR and Recent Results.

## 2018-09-06 NOTE — PROGRESS NOTES
Bedside shift change report given to [facility] (oncoming nurse) by Alycia Durant (offgoing nurse). Report included the following information SBAR.

## 2018-09-06 NOTE — PROGRESS NOTES
vss af abd soft . Feels distended still . Passing flatus but no sig BM for a few days. May have persistent ileus or edema at the anastomosis. Will get kub. Repeat sup

## 2018-09-06 NOTE — PROGRESS NOTES
Hospitalist Progress Note Micheline Miguel MD 
Answering service: [phone] from in house phone Date of Service:  2018 NAME:  Janine Kay :  1945 MRN:  [id number] Admission Summary:  
75 y/o male with bladder cancer s/p radical cystoprostatectomy, urethrectomy, extended bilateral pelvic lymphadenectomy, ileal conduit and urinary diversion for bladder cancer on 2018. Presented with abdominal pain, distention, no BM since surgery. CT reported Fluid and stool filled distended colon. Status post cystectomy with prominent hematoma in the bladder resection bed. Patient admitted for possible post OP ileus. Kept NPO, started on IVF and urology consulted. Interval history / Subjective:  
 
States he feels better, no BM yet, passing gas. Assessment & Plan: #. Post OP ileus; patient with recent dx of bladder cancer s/p radical cystoprostatectomy, urethrectomy, extended bilateral pelvic lymphadenectomy, ileal conduit and urinary diversion for bladder cancer on 2018.  
- started on clear liquid diet, follow KUB, advance as tolerated per urology rec 
-Urology following #. SOB, resolved - CTA chest with no PE, but reported 5 mm pulmonary nodule right middle lobe, follow-up recommended in one year. LE doppler negative for DVT. -Encourage incentive spirometer use 
-Wean off O2 supplement #. Episode of being incoherent/confusion and blurred vision: resolved now, unclear cause, may due to medications, stoke w/u initiated. CTA head negative, carotid doppler with no hemodynamically significant stenosis. MRI brain negative. TTE normal report. - tylenol and Toradol  for pain control #.  Anemia; acute, related to blood loss in view of recent surgery and CT abd/pelvis  demonstrated prominent hematoma in the bladder resection bed. 
-No active bleeding at this time;  
 -Transfused one unit pRBC yesterday for Hb6.8, responded appropriately,  
-Monitor H/H 
-Resume iron supplement. #. Leukocytosis: likely reactive, stress induced. -WBC down trending #. HTN: stable, Resume home meds Lung nodule: 5mm in RML of CT 9/4. Follow up CT in 1 year. Code status: full DVT prophylaxis: SCD Care Plan discussed with: patient and son. Disposition: D Hospital Problems  Date Reviewed: 9/3/2018 Codes Class Noted POA * (Principal)Acute hyponatremia ICD-10-CM: E87.1 ICD-9-CM: 276.1  9/3/2018 Unknown Leukocytosis ICD-10-CM: J79.907 ICD-9-CM: 288.60  9/3/2018 Unknown Anemia ICD-10-CM: D64.9 ICD-9-CM: 285.9  9/3/2018 Unknown Generalized abdominal pain ICD-10-CM: R10.84 ICD-9-CM: 789.07  9/3/2018 Unknown Review of Systems: A comprehensive review of systems was negative except for that written in the HPI. Vital Signs:  
 Last 24hrs VS reviewed since prior progress note. Most recent are: 
Visit Vitals  /79 (BP 1 Location: Right arm, BP Patient Position: At rest)  Pulse 92  Temp 98.1 °F (36.7 °C)  Resp 18  Ht 5' 8\" (1.727 m)  Wt 78.4 kg (172 lb 12.8 oz)  SpO2 99%  BMI 26.27 kg/m2 Intake/Output Summary (Last 24 hours) at 09/06/18 1250 Last data filed at 09/06/18 1221 Gross per 24 hour Intake          7954.17 ml Output             4225 ml Net          3729.17 ml Physical Examination:  
 
 
     
Constitutional:  No acute distress, cooperative, pleasant   
ENT:  Oral mucous moist, oropharynx benign. Neck supple, Resp:  bibasilar crackles, no wheezing CV:  Regular rhythm, normal rate, no murmurs, gallops, rubs GI:  Soft, appropriately tender, +BS, ileal conduit ostomy RLQ-clear urine. Musculoskeletal:  No edema, warm, 2+ pulses throughout Neurologic:  Moves all extremities. AAOx3, CN II-XII reviewed Psych:  Good insight, Not anxious nor agitated. Data Review:  
 Review and/or order of clinical lab test 
Review and/or order of tests in the radiology section of [facility] Labs:  
 
Recent Labs  
   09/06/18 
 0249  09/05/18 
 0208  09/04/18 
 0157 WBC   --   8.2  11.0 HGB  7.8*  8.1*  6.8* HCT   --   25.1*  21.2*  
PLT   --   360  360 Recent Labs  
   09/06/18 
 0249  09/05/18 
 0208  09/04/18 
 0157 NA  134*  134*  134* K  4.0  4.0  4.4 CL  103  101  101 CO2  21  25  24 BUN  8  10  14 CREA  0.75  0.87  0.92  
GLU  104*  95  90 CA  7.6*  7.4*  7.2* No results for input(s): SGOT, GPT, ALT, AP, TBIL, TBILI, TP, ALB, GLOB, GGT, AML, LPSE in the last 72 hours. No lab exists for component: AMYP, HLPSE Recent Labs  
   09/04/18 
 4518 INR  1.1 PTP  11.4* No results for input(s): FE, TIBC, PSAT, FERR in the last 72 hours. No results found for: FOL, RBCF No results for input(s): PH, PCO2, PO2 in the last 72 hours. No results for input(s): CPK, CKNDX, TROIQ in the last 72 hours. No lab exists for component: CPKMB No results found for: CHOL, CHOLX, CHLST, CHOLV, HDL, LDL, LDLC, DLDLP, TGLX, TRIGL, TRIGP, CHHD, CHHDX Lab Results Component Value Date/Time Glucose (POC) 97 09/04/2018 03:58 AM  
 
Lab Results Component Value Date/Time Color YELLOW/STRAW 09/03/2018 04:27 AM  
 Appearance CLEAR 09/03/2018 04:27 AM  
 Specific gravity 1.010 09/03/2018 04:27 AM  
 Specific gravity 1.012 08/21/2018 12:46 PM  
 pH (UA) 6.0 09/03/2018 04:27 AM  
 Protein 100 (A) 09/03/2018 04:27 AM  
 Glucose NEGATIVE  09/03/2018 04:27 AM  
 Ketone TRACE (A) 09/03/2018 04:27 AM  
 Bilirubin NEGATIVE  09/03/2018 04:27 AM  
 Urobilinogen 0.2 09/03/2018 04:27 AM  
 Nitrites NEGATIVE  09/03/2018 04:27 AM  
 Leukocyte Esterase MODERATE (A) 09/03/2018 04:27 AM  
 Epithelial cells FEW 09/03/2018 04:27 AM  
 Bacteria NEGATIVE  09/03/2018 04:27 AM  
 WBC 10-20 09/03/2018 04:27 AM  
 RBC 20-50 09/03/2018 04:27 AM  
 
 
 
Medications Reviewed: Current Facility-Administered Medications Medication Dose Route Frequency  ascorbic acid (vitamin C) (VITAMIN C) tablet 500 mg  500 mg Oral DAILY  atorvastatin (LIPITOR) tablet 20 mg  20 mg Oral QHS  lisinopril (PRINIVIL, ZESTRIL) tablet 10 mg  10 mg Oral QHS  ferrous sulfate tablet 325 mg  1 Tab Oral DAILY WITH BREAKFAST  0.9% sodium chloride infusion 250 mL  250 mL IntraVENous PRN  
 ketorolac (TORADOL) injection 15 mg  15 mg IntraVENous Q6H PRN  
 0.9% sodium chloride infusion  100 mL/hr IntraVENous CONTINUOUS  
 sodium chloride (NS) flush 5-10 mL  5-10 mL IntraVENous Q8H  
 sodium chloride (NS) flush 5-10 mL  5-10 mL IntraVENous PRN  
 ondansetron (ZOFRAN) injection 4 mg  4 mg IntraVENous Q4H PRN  
 
______________________________________________________________________ EXPECTED LENGTH OF STAY: 3d 4h 
ACTUAL LENGTH OF STAY:          3 Renae Lopez MD

## 2018-09-07 LAB
ABO + RH BLD: NORMAL
ANION GAP SERPL CALC-SCNC: 9 MMOL/L (ref 5–15)
BASOPHILS # BLD: 0 K/UL (ref 0–0.1)
BASOPHILS NFR BLD: 0 % (ref 0–1)
BLD PROD TYP BPU: NORMAL
BLOOD GROUP ANTIBODIES SERPL: NORMAL
BPU ID: NORMAL
BUN SERPL-MCNC: 7 MG/DL (ref 6–20)
BUN/CREAT SERPL: 8 (ref 12–20)
CALCIUM SERPL-MCNC: 7.9 MG/DL (ref 8.5–10.1)
CHLORIDE SERPL-SCNC: 101 MMOL/L (ref 97–108)
CO2 SERPL-SCNC: 25 MMOL/L (ref 21–32)
CREAT SERPL-MCNC: 0.83 MG/DL (ref 0.7–1.3)
CROSSMATCH RESULT,%XM: NORMAL
DIFFERENTIAL METHOD BLD: ABNORMAL
EOSINOPHIL # BLD: 0.4 K/UL (ref 0–0.4)
EOSINOPHIL NFR BLD: 4 % (ref 0–7)
ERYTHROCYTE [DISTWIDTH] IN BLOOD BY AUTOMATED COUNT: 14.5 % (ref 11.5–14.5)
GLUCOSE SERPL-MCNC: 109 MG/DL (ref 65–100)
HCT VFR BLD AUTO: 28 % (ref 36.6–50.3)
HGB BLD-MCNC: 9 G/DL (ref 12.1–17)
IMM GRANULOCYTES # BLD: 0.1 K/UL (ref 0–0.04)
IMM GRANULOCYTES NFR BLD AUTO: 1 % (ref 0–0.5)
LYMPHOCYTES # BLD: 1 K/UL (ref 0.8–3.5)
LYMPHOCYTES NFR BLD: 11 % (ref 12–49)
MCH RBC QN AUTO: 29.5 PG (ref 26–34)
MCHC RBC AUTO-ENTMCNC: 32.1 G/DL (ref 30–36.5)
MCV RBC AUTO: 91.8 FL (ref 80–99)
MONOCYTES # BLD: 1.1 K/UL (ref 0–1)
MONOCYTES NFR BLD: 11 % (ref 5–13)
NEUTS SEG # BLD: 6.9 K/UL (ref 1.8–8)
NEUTS SEG NFR BLD: 73 % (ref 32–75)
NRBC # BLD: 0 K/UL (ref 0–0.01)
NRBC BLD-RTO: 0 PER 100 WBC
PLATELET # BLD AUTO: 441 K/UL (ref 150–400)
PMV BLD AUTO: 9.3 FL (ref 8.9–12.9)
POTASSIUM SERPL-SCNC: 3.9 MMOL/L (ref 3.5–5.1)
RBC # BLD AUTO: 3.05 M/UL (ref 4.1–5.7)
SODIUM SERPL-SCNC: 135 MMOL/L (ref 136–145)
SPECIMEN EXP DATE BLD: NORMAL
STATUS OF UNIT,%ST: NORMAL
UNIT DIVISION, %UDIV: NORMAL
WBC # BLD AUTO: 9.5 K/UL (ref 4.1–11.1)

## 2018-09-07 PROCEDURE — 74011250637 HC RX REV CODE- 250/637: Performed by: INTERNAL MEDICINE

## 2018-09-07 PROCEDURE — 77030010541

## 2018-09-07 PROCEDURE — 36415 COLL VENOUS BLD VENIPUNCTURE: CPT | Performed by: UROLOGY

## 2018-09-07 PROCEDURE — 77030008027

## 2018-09-07 PROCEDURE — 65270000029 HC RM PRIVATE

## 2018-09-07 PROCEDURE — 80048 BASIC METABOLIC PNL TOTAL CA: CPT | Performed by: UROLOGY

## 2018-09-07 PROCEDURE — 85025 COMPLETE CBC W/AUTO DIFF WBC: CPT | Performed by: HOSPITALIST

## 2018-09-07 PROCEDURE — 74011250636 HC RX REV CODE- 250/636: Performed by: UROLOGY

## 2018-09-07 PROCEDURE — 74011250636 HC RX REV CODE- 250/636: Performed by: INTERNAL MEDICINE

## 2018-09-07 RX ADMIN — KETOROLAC TROMETHAMINE 15 MG: 30 INJECTION, SOLUTION INTRAMUSCULAR; INTRAVENOUS at 08:55

## 2018-09-07 RX ADMIN — ATORVASTATIN CALCIUM 20 MG: 20 TABLET, FILM COATED ORAL at 22:51

## 2018-09-07 RX ADMIN — KETOROLAC TROMETHAMINE 15 MG: 30 INJECTION, SOLUTION INTRAMUSCULAR; INTRAVENOUS at 15:49

## 2018-09-07 RX ADMIN — Medication 10 ML: at 08:56

## 2018-09-07 RX ADMIN — OXYCODONE HYDROCHLORIDE AND ACETAMINOPHEN 500 MG: 500 TABLET ORAL at 08:54

## 2018-09-07 RX ADMIN — Medication 10 ML: at 15:50

## 2018-09-07 RX ADMIN — FERROUS SULFATE TAB 325 MG (65 MG ELEMENTAL FE) 325 MG: 325 (65 FE) TAB at 07:07

## 2018-09-07 RX ADMIN — SODIUM CHLORIDE 100 ML/HR: 900 INJECTION, SOLUTION INTRAVENOUS at 00:13

## 2018-09-07 RX ADMIN — LISINOPRIL 10 MG: 10 TABLET ORAL at 22:51

## 2018-09-07 RX ADMIN — KETOROLAC TROMETHAMINE 15 MG: 30 INJECTION, SOLUTION INTRAMUSCULAR; INTRAVENOUS at 02:55

## 2018-09-07 NOTE — DISCHARGE INSTRUCTIONS
Patient Discharge Instructions    Linh Weems / [id number] : 1945    Admitted 2018 Discharged: 2018     Take Home Medications       · It is important that you take the medication exactly as they are prescribed. · Keep your medication in the bottles provided by the pharmacist and keep a list of the medication names, dosages, and times to be taken in your wallet. · Do not take other medications without consulting your doctor. What to do at Home      Recommended activity: No Lifting for 6 weeks. Follow-up with [facility]. Call for an appointment (if not already scheduled)            [phone]. Information obtained by :  I understand that if any problems occur once I am at home I am to contact my physician. I understand and acknowledge receipt of the instructions indicated above.                                                                                                                                            Physician's or R.N.'s Signature                                                                  Date/Time                                                                                                                                              Patient or Representative Signature                                                          Date/Time

## 2018-09-07 NOTE — PROGRESS NOTES
Bedside and Verbal shift change report given to Miguel Mcclelland RN (oncoming nurse) by Raul Jacobs RN (offgoing nurse). Report included the following information SBAR, Kardex, Procedure Summary, Intake/Output, MAR and Recent Results.

## 2018-09-07 NOTE — PROGRESS NOTES
Hospitalist Progress Note Micheline Miguel MD 
Answering service: [phone] from in house phone Date of Service:  2018 NAME:  Janine Kay :  1945 MRN:  [id number] Admission Summary:  
73 y/o male with bladder cancer s/p radical cystoprostatectomy, urethrectomy, extended bilateral pelvic lymphadenectomy, ileal conduit and urinary diversion for bladder cancer on 2018. Presented with abdominal pain, distention, no BM since surgery. CT reported Fluid and stool filled distended colon. Status post cystectomy with prominent hematoma in the bladder resection bed. Patient admitted for possible post OP ileus. Kept NPO, started on IVF and urology consulted. Interval history / Subjective:  
 
States he feels better, no BM yet, passing gas. Assessment & Plan: #. Post OP ileus; patient with recent dx of bladder cancer s/p radical cystoprostatectomy, urethrectomy, extended bilateral pelvic lymphadenectomy, ileal conduit and urinary diversion for bladder cancer on 2018.  
- started on clear liquid diet, follow KUB, advance as tolerated per urology rec 
-Urology following, advance diet to regular #. SOB, resolved - CTA chest with no PE, but reported 5 mm pulmonary nodule right middle lobe, follow-up recommended in one year. LE doppler negative for DVT. -Encourage incentive spirometer use 
-Wean off O2 supplement #. Episode of being incoherent/confusion and blurred vision: resolved now, unclear cause, may due to medications, stoke w/u initiated. CTA head negative, carotid doppler with no hemodynamically significant stenosis. MRI brain negative. TTE normal report. - tylenol and Toradol  for pain control #.  Anemia; acute, related to blood loss in view of recent surgery and CT abd/pelvis  demonstrated prominent hematoma in the bladder resection bed. 
-No active bleeding at this time;  
 -Transfused one unit pRBC yesterday for Hb6.8, responded appropriately,  
-Monitor H/H 
-Resume iron supplement. #. Leukocytosis: likely reactive, stress induced. -WBC down trending #. HTN: stable, Resume home meds Lung nodule: 5mm in RML of CT 9/4. Follow up CT in 1 year. Code status: full DVT prophylaxis: SCD Care Plan discussed with: patient and son. Disposition: home in AM if stable, tolerate diet Hospital Problems  Date Reviewed: 9/3/2018 Codes Class Noted POA * (Principal)Acute hyponatremia ICD-10-CM: E87.1 ICD-9-CM: 276.1  9/3/2018 Unknown Leukocytosis ICD-10-CM: T23.137 ICD-9-CM: 288.60  9/3/2018 Unknown Anemia ICD-10-CM: D64.9 ICD-9-CM: 285.9  9/3/2018 Unknown Generalized abdominal pain ICD-10-CM: R10.84 ICD-9-CM: 789.07  9/3/2018 Unknown Review of Systems: A comprehensive review of systems was negative except for that written in the HPI. Vital Signs:  
 Last 24hrs VS reviewed since prior progress note. Most recent are: 
Visit Vitals  /74 (BP 1 Location: Right arm, BP Patient Position: Head of bed elevated (Comment degrees))  Pulse 84  Temp 98 °F (36.7 °C)  Resp 16  
 Ht 5' 8\" (1.727 m)  Wt 78.4 kg (172 lb 12.8 oz)  SpO2 95%  BMI 26.27 kg/m2 Intake/Output Summary (Last 24 hours) at 09/07/18 1621 Last data filed at 09/07/18 8724 Gross per 24 hour Intake                0 ml Output             2200 ml Net            -2200 ml Physical Examination:  
 
 
     
Constitutional:  No acute distress, cooperative, pleasant   
ENT:  Oral mucous moist, oropharynx benign. Neck supple, Resp:  bibasilar crackles, no wheezing CV:  Regular rhythm, normal rate, no murmurs, gallops, rubs GI:  Soft, appropriately tender, +BS, ileal conduit ostomy RLQ-clear urine. Musculoskeletal:  No edema, warm, 2+ pulses throughout Neurologic:  Moves all extremities. AAOx3, CN II-XII reviewed Psych:  Good insight, Not anxious nor agitated. Data Review:  
 Review and/or order of clinical lab test 
Review and/or order of tests in the radiology section of [facility] Labs:  
 
Recent Labs  
   09/07/18 
 0303  09/06/18 
 0249  09/05/18 
 0130 WBC  9.5   --   8.2 HGB  9.0*  7.8*  8.1* HCT  28.0*   --   25.1*  
PLT  441*   --   360 Recent Labs  
   09/07/18 
 0303  09/06/18 
 0249  09/05/18 
 1528 NA  135*  134*  134* K  3.9  4.0  4.0  
CL  101  103  101 CO2  25  21  25 BUN  7  8  10 CREA  0.83  0.75  0.87 GLU  109*  104*  95  
CA  7.9*  7.6*  7.4* No results for input(s): SGOT, GPT, ALT, AP, TBIL, TBILI, TP, ALB, GLOB, GGT, AML, LPSE in the last 72 hours. No lab exists for component: AMYP, HLPSE No results for input(s): INR, PTP, APTT in the last 72 hours. No lab exists for component: INREXT, INREXT No results for input(s): FE, TIBC, PSAT, FERR in the last 72 hours. No results found for: FOL, RBCF No results for input(s): PH, PCO2, PO2 in the last 72 hours. No results for input(s): CPK, CKNDX, TROIQ in the last 72 hours. No lab exists for component: CPKMB No results found for: CHOL, CHOLX, CHLST, CHOLV, HDL, LDL, LDLC, DLDLP, TGLX, TRIGL, TRIGP, CHHD, CHHDX Lab Results Component Value Date/Time Glucose (POC) 97 09/04/2018 03:58 AM  
 
Lab Results Component Value Date/Time  Color YELLOW/STRAW 09/03/2018 04:27 AM  
 Appearance CLEAR 09/03/2018 04:27 AM  
 Specific gravity 1.010 09/03/2018 04:27 AM  
 Specific gravity 1.012 08/21/2018 12:46 PM  
 pH (UA) 6.0 09/03/2018 04:27 AM  
 Protein 100 (A) 09/03/2018 04:27 AM  
 Glucose NEGATIVE  09/03/2018 04:27 AM  
 Ketone TRACE (A) 09/03/2018 04:27 AM  
 Bilirubin NEGATIVE  09/03/2018 04:27 AM  
 Urobilinogen 0.2 09/03/2018 04:27 AM  
 Nitrites NEGATIVE  09/03/2018 04:27 AM  
 Leukocyte Esterase MODERATE (A) 09/03/2018 04:27 AM  
 Epithelial cells FEW 09/03/2018 04:27 AM  
 Bacteria NEGATIVE  09/03/2018 04:27 AM  
 WBC 10-20 09/03/2018 04:27 AM  
 RBC 20-50 09/03/2018 04:27 AM  
 
 
 
Medications Reviewed:  
 
Current Facility-Administered Medications Medication Dose Route Frequency  ascorbic acid (vitamin C) (VITAMIN C) tablet 500 mg  500 mg Oral DAILY  atorvastatin (LIPITOR) tablet 20 mg  20 mg Oral QHS  lisinopril (PRINIVIL, ZESTRIL) tablet 10 mg  10 mg Oral QHS  ferrous sulfate tablet 325 mg  1 Tab Oral DAILY WITH BREAKFAST  0.9% sodium chloride infusion 250 mL  250 mL IntraVENous PRN  
 ketorolac (TORADOL) injection 15 mg  15 mg IntraVENous Q6H PRN  
 0.9% sodium chloride infusion  100 mL/hr IntraVENous CONTINUOUS  
 sodium chloride (NS) flush 5-10 mL  5-10 mL IntraVENous Q8H  
 sodium chloride (NS) flush 5-10 mL  5-10 mL IntraVENous PRN  
 ondansetron (ZOFRAN) injection 4 mg  4 mg IntraVENous Q4H PRN  
 
______________________________________________________________________ EXPECTED LENGTH OF STAY: 3d 4h 
ACTUAL LENGTH OF STAY:          4 Dorothy Naik MD

## 2018-09-07 NOTE — PROGRESS NOTES
[facility] follow-up appointment on Wednesday September 12,2018 @ 10:00 a.m. with Dr. Emi Roy. Added to AVS. Sabino Tyler CM Specialist

## 2018-09-07 NOTE — PROGRESS NOTES
Bedside shift change report given to [facility] (oncoming nurse) by Marlen Luna (offgoing nurse). Report included the following information SBAR, Kardex and MAR.

## 2018-09-07 NOTE — PROGRESS NOTES
vss af abd soft stoma pink. Multiple BM and gas.  kub shows air in colon c/w slowing resolving colonic ileus. Pelvic hematoma draining via perineal incision. Plan DC today or tomorrow depending on ability to tolerate diet.

## 2018-09-08 VITALS
RESPIRATION RATE: 18 BRPM | DIASTOLIC BLOOD PRESSURE: 81 MMHG | TEMPERATURE: 97.6 F | SYSTOLIC BLOOD PRESSURE: 131 MMHG | HEIGHT: 68 IN | HEART RATE: 91 BPM | WEIGHT: 172.8 LBS | BODY MASS INDEX: 26.19 KG/M2 | OXYGEN SATURATION: 96 %

## 2018-09-08 LAB
HCT VFR BLD AUTO: 27.1 % (ref 36.6–50.3)
HGB BLD-MCNC: 8.8 G/DL (ref 12.1–17)

## 2018-09-08 PROCEDURE — 85018 HEMOGLOBIN: CPT | Performed by: HOSPITALIST

## 2018-09-08 PROCEDURE — 74011250637 HC RX REV CODE- 250/637: Performed by: INTERNAL MEDICINE

## 2018-09-08 PROCEDURE — 36415 COLL VENOUS BLD VENIPUNCTURE: CPT | Performed by: HOSPITALIST

## 2018-09-08 PROCEDURE — 74011250636 HC RX REV CODE- 250/636: Performed by: UROLOGY

## 2018-09-08 RX ORDER — TRAMADOL HYDROCHLORIDE 50 MG/1
50 TABLET ORAL
Qty: 20 TAB | Refills: 0 | Status: SHIPPED | OUTPATIENT
Start: 2018-09-08

## 2018-09-08 RX ADMIN — OXYCODONE HYDROCHLORIDE AND ACETAMINOPHEN 500 MG: 500 TABLET ORAL at 09:20

## 2018-09-08 RX ADMIN — KETOROLAC TROMETHAMINE 15 MG: 30 INJECTION, SOLUTION INTRAMUSCULAR; INTRAVENOUS at 03:41

## 2018-09-08 RX ADMIN — Medication 10 ML: at 03:44

## 2018-09-08 RX ADMIN — FERROUS SULFATE TAB 325 MG (65 MG ELEMENTAL FE) 325 MG: 325 (65 FE) TAB at 07:54

## 2018-09-08 NOTE — PROGRESS NOTES
8847 Patient educated with discharge instructions and rx. Patient verbalized understanding with adequate teach back. Patient signed copy of the discharge instructions that were then placed on the hard chart. 1921 Dr. Candy Colon notified of patient discharge by urology. MD to see patient.

## 2018-09-08 NOTE — DISCHARGE SUMMARY
[address]    Kasia Peterson  MR#: [id number]  : 1945  ACCOUNT #: [de-identified]   ADMIT DATE: 2018  DISCHARGE DATE: 2018    DIAGNOSES ON ADMISSION:    1. Ileus. 2.  Bladder cancer status post radical cystoprostatectomy. DIAGNOSES ON DISCHARGE:    1. Ileus. 2.  Bladder cancer status post radical cystoprostatectomy. HOSPITAL COURSE:  The patient is a 77-year-old gentleman with previous history significant for bladder cancer. He had radical cystoprostatectomy, ureterectomy and ileal conduit done on  by Dr. Alejandra Arreguin. The patient presented to the emergency room with abdominal pain. Further workup revealed ileus. The CT showed fluid and stool filled distended colon. Patient was admitted, was followed up by Urology for his recent surgery. Patient was started on clear liquids. For the last 2 days he had now good bowel movement. He is cleared by Urology to be discharged. He also had a CTA of the chest which showed 5 mm pulmonary nodule for which follow up in 1 year was recommended. Patient also has anemia and was transfused for a hemoglobin of 6.8. His last hemoglobin is 8.8. He is discharged home in a stable condition. DISCHARGE MEDICATIONS:  Include Lipitor 20 mg daily, lisinopril 10 mg daily and Toradol. I have held his ferrous sulfate and ascorbic acid due to recent ileus. The patient will be on a full liquid diet for another 24 hours and then advance as tolerated. FOLLOWUP:  Follow up with PCP in 1 week. DISPOSITION:  Home.       MD BEVERLY Spencer/[place]  D: 2018 10:19     T: 2018 12:25  JOB #: [id number]

## 2018-09-10 NOTE — PROGRESS NOTES
CM received call from patient's wife Juan Carlos Ley [phone]. Patient was discharged over weekend, no home health in place. CM assessment from 9/3-9/8/18 admission (CM note 9/4/18 BIJAN Chavez) does not mention the [facility] that was set up during pt's 8/27-8/31/18 [facility] [place] stay, thus resumption orders were never requested or submitted. This CM entered [facility] resumption order, sent clinicals with order to [facility] (ph [phone]/fax [phone]), s/w intake nurse Doc Cid who confirmed receipt of orders and will follow-up with pt/wife immediately. CM s/w patient's wife Juan Carlos Ley, provided update, provided [facility] contact info. HH to resume services immediately, Juan Carlos Ley confirms that patient to see PCP on Wednesday 9/12.

## 2018-09-18 NOTE — DISCHARGE SUMMARY
[address]    Nikhil Delarosa  MR#: [id number]  : 1945  ACCOUNT #: [de-identified]   ADMIT DATE: 2018  DISCHARGE DATE: 2018    ADMISSION DIAGNOSIS:  Bladder cancer. PROCEDURE PERFORMED:  Radical cystoprostatectomy, urethrectomy and bilateral pelvic lymphadenectomy with ileal conduit urinary diversion. BRIEF SUMMARY:  The gentleman underwent the above operation on 2018. Postoperatively, he improved at a normal rate. Flatus started on 2018. Hemoglobin remained stable and his diet was advanced. His pain was controlled. He met with enterostomal therapist.  Pathology report pending. CONDITION AT DISCHARGE:  Good. DISCHARGE DISPOSITION:  To home with home health care. DISCHARGE MEDICATIONS:  Included oral pain medication and antibiotics. He was ready for discharge on 2018 with office followup arranged.       Merwyn Alpers, MD [facility]  D: 2018 09:17     T: 2018 10:34  JOB #: [id number]  CC: [address] [facility]

## (undated) DEVICE — SUTURE PERMAHAND SZ 3-0 L30IN NONABSORBABLE BLK SILK BRAID A304H

## (undated) DEVICE — NDL PRT INJ NSAF BLNT 18GX1.5 --

## (undated) DEVICE — Z CONVERTED USE 2271043 CONTAINER SPEC COLL 4OZ SCR ON LID PEEL PCH

## (undated) DEVICE — 3M™ RANGER™ FLUID WARMING IRRIGATION SET, 24750, 10/CASE: Brand: 3M™ RANGER™

## (undated) DEVICE — DRAPE FLD WRM W44XL66IN C6L FOR INTRATEMP SYS THERMABASIN

## (undated) DEVICE — CATHETER URETH 26FR BLLN 5CC LTX 3 W F SPEC SHT RND TIP TWO

## (undated) DEVICE — (D)SUT MCRYL 4 36IN RB1 CUST -- DISC BY MFR

## (undated) DEVICE — INFECTION CONTROL KIT SYS

## (undated) DEVICE — SUT CHRMC 0 27IN CT1 BRN --

## (undated) DEVICE — SYR 10ML LUER LOK 1/5ML GRAD --

## (undated) DEVICE — ASTOUND STANDARD SURGICAL GOWN, XL: Brand: CONVERTORS

## (undated) DEVICE — 3000CC GUARDIAN II: Brand: GUARDIAN

## (undated) DEVICE — GAUZE SPONGES,12 PLY: Brand: CURITY

## (undated) DEVICE — 3M™ IOBAN™ 2 ANTIMICROBIAL INCISE DRAPE 6648EZ: Brand: IOBAN™ 2

## (undated) DEVICE — LOADING UNIT WITH DST SERIES TECHNOLOGY: Brand: GIA

## (undated) DEVICE — STERILE POLYISOPRENE POWDER-FREE SURGICAL GLOVES WITH EMOLLIENT COATING: Brand: PROTEXIS

## (undated) DEVICE — DRAIN SURG 19FR SIL RND HUBLESS W/ 0.25IN BEND TRCR BLAK

## (undated) DEVICE — DEVON™ KNEE AND BODY STRAP 60" X 3" (1.5 M X 7.6 CM): Brand: DEVON

## (undated) DEVICE — UROSTOMY KIT, FLEXTEND: Brand: NEW IMAGE

## (undated) DEVICE — 3M™ DURAPORE™ SURGICAL TAPE 2 INCHES X 10YARDS (5.0CM X 9.1M) 6ROLLS/CARTON 10CARTONS/CASE 1538-2: Brand: 3M™ DURAPORE™

## (undated) DEVICE — SUTURE VCRL SZ 4-0 L27IN ABSRB UD L17MM RB-1 1/2 CIR J214H

## (undated) DEVICE — TRAY PREP DRY W/ PREM GLV 2 APPL 6 SPNG 2 UNDPD 1 OVERWRAP

## (undated) DEVICE — Z DISCONTINUED USE 2599823 ELECTRODE ENDO 27FR 0.3MM MPLR LOOP DISP

## (undated) DEVICE — ROCKER SWITCH PENCIL BLADE ELECTRODE, HOLSTER: Brand: EDGE

## (undated) DEVICE — STAPLER WITH DST SERIES TECHNOLOGY: Brand: GIA

## (undated) DEVICE — SKIN MARKER,REGULAR TIP WITH RULER AND LABELS: Brand: DEVON

## (undated) DEVICE — DBD-PACK,LAPAROTOMY,2 REINFORCED GOWNS: Brand: MEDLINE

## (undated) DEVICE — CONTAINER,SPECIMEN,4OZ,OR STRL: Brand: MEDLINE

## (undated) DEVICE — SUTURE ETHLN SZ 2-0 L18IN NONABSORBABLE BLK L26MM FS 3/8 664G

## (undated) DEVICE — CYSTOSCOPY PACK: Brand: CONVERTORS

## (undated) DEVICE — BANDAGE COMPR SELF ADH 5 YDX4 IN TAN STRL PREMIERPRO LF

## (undated) DEVICE — SURGICAL PROCEDURE PACK BASIN MAJ SET CUST NO CAUT

## (undated) DEVICE — SUT CHRMC 0 54IN REEL BRN --

## (undated) DEVICE — TIP SUCT BLU PLAS BLB W/O CTRL VENT YANK

## (undated) DEVICE — SUTURE PERMAHAND SZ 3-0 L18IN NONABSORBABLE BLK L26MM SH C013D

## (undated) DEVICE — KENDALL SCD EXPRESS SLEEVES, KNEE LENGTH, MEDIUM: Brand: KENDALL SCD

## (undated) DEVICE — BIPOLAR FORCEPS CORD: Brand: VALLEYLAB

## (undated) DEVICE — CATH URETH FOL 3W SH 24FRX5ML -- LUBRICATH

## (undated) DEVICE — SUTURE PDS II SZ 1 L36IN ABSRB VLT L48MM CTX 1/2 CIR Z371T

## (undated) DEVICE — TOWEL SURG W17XL27IN STD BLU COT NONFENESTRATED PREWASHED

## (undated) DEVICE — Z DISCONTINUEDSOLUTION PREP 2OZ 10% POVIDONE IOD SCR CAP BTL

## (undated) DEVICE — BULB SYRINGE, IRRIGATION WITH PROTECTIVE CAP, 60 CC, INDIVIDUALLY WRAPPED: Brand: DOVER

## (undated) DEVICE — SPONGE LAP 18X18IN STRL -- 5/PK

## (undated) DEVICE — WRAP SURG W1.31XL1.34M CARD FOR PT 165-172CM THERMOWRP

## (undated) DEVICE — SLIM BODY SKIN STAPLER: Brand: APPOSE ULC

## (undated) DEVICE — CLIP LIG L TI MTL LIG SYS 24 COUNT HORZ

## (undated) DEVICE — 1/4 IN. X 18 IN. LENGTH: Brand: SILICONE TUBING, PENROSE DRAIN

## (undated) DEVICE — SOL IRR GLYC 1.5 % 3000ML --

## (undated) DEVICE — 1/2 IN. X 18 IN. LENGTH: Brand: SILICONE TUBING, PENROSE DRAIN

## (undated) DEVICE — SUT ETHLN 3-0 18IN FS1 BLK --

## (undated) DEVICE — SOLUTION IRRIG 1000ML H2O STRL BLT

## (undated) DEVICE — Device

## (undated) DEVICE — BLADE ASSEMB CLP HAIR FINE --

## (undated) DEVICE — SUTURE CHROMIC GUT SZ 2-0 L27IN ABSRB BRN L26MM SH 1/2 CIR G123H

## (undated) DEVICE — TIDISHIELD UROLOGY DRAIN BAGS FROSTY VINYL STERILE FITS SIEMENS UROSKOP ACCESS 20 PER CASE: Brand: TIDISHIELD

## (undated) DEVICE — BAG DRAIN URIN 2000ML LF STRL -- CONVERT TO ITEM 363123

## (undated) DEVICE — REM POLYHESIVE ADULT PATIENT RETURN ELECTRODE: Brand: VALLEYLAB

## (undated) DEVICE — ELECTRODE BLDE L4IN NONINSULATED EDGE

## (undated) DEVICE — INTENDED USED TO PROTECT, TAG AND HELP LOCATED SUTURES DURING SURGERY: Brand: STERION®SUTURE AID BOOTIES

## (undated) DEVICE — SOLUTION IV 1000ML 0.9% SOD CHL

## (undated) DEVICE — GOWN,SIRUS,POLYRNF,BRTHSLV,XL,30/CS: Brand: MEDLINE

## (undated) DEVICE — CATHETER PLUG WITH CAP: Brand: DOVER

## (undated) DEVICE — SINGLE BASIN: Brand: CARDINAL HEALTH

## (undated) DEVICE — CURVED, LARGE JAW, OPEN SEALER/DIVIDER NANO-COATED: Brand: LIGASURE IMPACT

## (undated) DEVICE — 3M™ DURAPORE™ SURGICAL TAPE 1538-3, 3 INCH X 10 YARD (7,5CM X 9,1M), 4 ROLLS/BOX: Brand: 3M™ DURAPORE™

## (undated) DEVICE — SKIN TEMPERATURE SENSOR: Brand: DEROYAL

## (undated) DEVICE — SUT CHRMC 3-0 27IN SH BRN --

## (undated) DEVICE — MEDI-VAC NON-CONDUCTIVE SUCTION TUBING: Brand: CARDINAL HEALTH

## (undated) DEVICE — ZINACTIVE USE 2641837 CLIP LIG M BLU TI HRT SHP WIRE HORZ 600 PER BX

## (undated) DEVICE — SUT CHRMC 4-0 27IN RB1 BRN --